# Patient Record
Sex: FEMALE | Race: WHITE | NOT HISPANIC OR LATINO | Employment: PART TIME | URBAN - METROPOLITAN AREA
[De-identification: names, ages, dates, MRNs, and addresses within clinical notes are randomized per-mention and may not be internally consistent; named-entity substitution may affect disease eponyms.]

---

## 2019-02-11 ENCOUNTER — HISTORICAL (OUTPATIENT)
Dept: ADMINISTRATIVE | Facility: HOSPITAL | Age: 44
End: 2019-02-11

## 2019-02-11 LAB
ABS NEUT (OLG): 3.2 X10(3)/MCL (ref 2.1–9.2)
ALBUMIN SERPL-MCNC: 4.6 GM/DL (ref 3.4–5)
ALBUMIN/GLOB SERPL: 1.84 {RATIO} (ref 1.5–2.5)
ALP SERPL-CCNC: 32 UNIT/L (ref 38–126)
ALT SERPL-CCNC: 12 UNIT/L (ref 7–52)
APPEARANCE, UA: NORMAL
AST SERPL-CCNC: 17 UNIT/L (ref 15–37)
BACTERIA #/AREA URNS AUTO: NORMAL /HPF
BILIRUB SERPL-MCNC: 0.6 MG/DL (ref 0.2–1)
BILIRUB UR QL STRIP: NEGATIVE MG/DL
BILIRUBIN DIRECT+TOT PNL SERPL-MCNC: 0.2 MG/DL (ref 0–0.5)
BILIRUBIN DIRECT+TOT PNL SERPL-MCNC: 0.4 MG/DL
BUN SERPL-MCNC: 11 MG/DL (ref 7–18)
CALCIUM SERPL-MCNC: 9.2 MG/DL (ref 8.5–10)
CHLORIDE SERPL-SCNC: 103 MMOL/L (ref 98–107)
CHOLEST SERPL-MCNC: 212 MG/DL (ref 0–200)
CHOLEST/HDLC SERPL: 2.2 {RATIO}
CO2 SERPL-SCNC: 23 MMOL/L (ref 21–32)
COLOR UR: YELLOW
CREAT SERPL-MCNC: 0.62 MG/DL (ref 0.6–1.3)
ERYTHROCYTE [DISTWIDTH] IN BLOOD BY AUTOMATED COUNT: 12.1 % (ref 11.5–17)
GLOBULIN SER-MCNC: 2.5 GM/DL (ref 1.2–3)
GLUCOSE (UA): NEGATIVE MG/DL
GLUCOSE SERPL-MCNC: 102 MG/DL (ref 74–106)
HCT VFR BLD AUTO: 40.8 % (ref 37–47)
HDLC SERPL-MCNC: 98 MG/DL (ref 35–60)
HGB BLD-MCNC: 12.9 GM/DL (ref 12–16)
HGB UR QL STRIP: NEGATIVE UNIT/L
KETONES UR QL STRIP: NEGATIVE MG/DL
LDLC SERPL CALC-MCNC: 75 MG/DL (ref 0–129)
LEUKOCYTE ESTERASE UR QL STRIP: NEGATIVE UNIT/L
LYMPHOCYTES # BLD AUTO: 2.4 X10(3)/MCL (ref 0.6–3.4)
LYMPHOCYTES NFR BLD AUTO: 37.7 % (ref 13–40)
MCH RBC QN AUTO: 32.1 PG (ref 27–31.2)
MCHC RBC AUTO-ENTMCNC: 32 GM/DL (ref 32–36)
MCV RBC AUTO: 102 FL (ref 80–94)
MONOCYTES # BLD AUTO: 0.8 X10(3)/MCL (ref 0.1–1.3)
MONOCYTES NFR BLD AUTO: 12 % (ref 0.1–24)
NEUTROPHILS NFR BLD AUTO: 50.3 % (ref 47–80)
NITRITE UR QL STRIP.AUTO: NEGATIVE
PH UR STRIP: 7 [PH]
PLATELET # BLD AUTO: 277 X10(3)/MCL (ref 130–400)
PMV BLD AUTO: 9.6 FL (ref 9.4–12.4)
POTASSIUM SERPL-SCNC: 4.3 MMOL/L (ref 3.5–5.1)
PROT SERPL-MCNC: 7.1 GM/DL (ref 6.4–8.2)
PROT UR QL STRIP: NEGATIVE MG/DL
RBC # BLD AUTO: 4.02 X10(6)/MCL (ref 4.2–5.4)
RBC #/AREA URNS HPF: NORMAL /HPF
SODIUM SERPL-SCNC: 137 MMOL/L (ref 136–145)
SP GR UR STRIP: 1.02
SQUAMOUS EPITHELIAL, UA: NORMAL /LPF
TRIGL SERPL-MCNC: 62 MG/DL (ref 30–150)
TSH SERPL-ACNC: 2.16 MIU/ML (ref 0.35–4.94)
UROBILINOGEN UR STRIP-ACNC: 0.2 MG/DL
VLDLC SERPL CALC-MCNC: 12.4 MG/DL
WBC # SPEC AUTO: 6.4 X10(3)/MCL (ref 4.5–11.5)
WBC #/AREA URNS AUTO: NORMAL /[HPF]

## 2019-03-11 ENCOUNTER — HISTORICAL (OUTPATIENT)
Dept: ADMINISTRATIVE | Facility: HOSPITAL | Age: 44
End: 2019-03-11

## 2019-03-11 LAB
FLUAV AG NPH QL IA: NEGATIVE
FLUBV AG NPH QL IA: NEGATIVE

## 2020-02-12 ENCOUNTER — HISTORICAL (OUTPATIENT)
Dept: ADMINISTRATIVE | Facility: HOSPITAL | Age: 45
End: 2020-02-12

## 2020-02-12 LAB
ABS NEUT (OLG): 3.7 X10(3)/MCL (ref 2.1–9.2)
ALBUMIN SERPL-MCNC: 4.6 GM/DL (ref 3.4–5)
ALBUMIN/GLOB SERPL: 1.53 {RATIO} (ref 1.5–2.5)
ALP SERPL-CCNC: 34 UNIT/L (ref 38–126)
ALT SERPL-CCNC: 15 UNIT/L (ref 7–52)
APPEARANCE, UA: CLEAR
AST SERPL-CCNC: 19 UNIT/L (ref 15–37)
BACTERIA #/AREA URNS AUTO: ABNORMAL /HPF
BILIRUB SERPL-MCNC: 0.5 MG/DL (ref 0.2–1)
BILIRUB UR QL STRIP: NEGATIVE MG/DL
BILIRUBIN DIRECT+TOT PNL SERPL-MCNC: 0.1 MG/DL (ref 0–0.5)
BILIRUBIN DIRECT+TOT PNL SERPL-MCNC: 0.4 MG/DL
BUN SERPL-MCNC: 13 MG/DL (ref 7–18)
CALCIUM SERPL-MCNC: 9.6 MG/DL (ref 8.5–10)
CHLORIDE SERPL-SCNC: 101 MMOL/L (ref 98–107)
CHOLEST SERPL-MCNC: 205 MG/DL (ref 0–200)
CHOLEST/HDLC SERPL: 1.9 {RATIO}
CO2 SERPL-SCNC: 28 MMOL/L (ref 21–32)
COLOR UR: YELLOW
CREAT SERPL-MCNC: 0.66 MG/DL (ref 0.6–1.3)
ERYTHROCYTE [DISTWIDTH] IN BLOOD BY AUTOMATED COUNT: 12.1 % (ref 11.5–17)
GLOBULIN SER-MCNC: 3 GM/DL (ref 1.2–3)
GLUCOSE (UA): NEGATIVE MG/DL
GLUCOSE SERPL-MCNC: 108 MG/DL (ref 74–106)
HCT VFR BLD AUTO: 39.7 % (ref 37–47)
HDLC SERPL-MCNC: 106 MG/DL (ref 35–60)
HGB BLD-MCNC: 12.9 GM/DL (ref 12–16)
HGB UR QL STRIP: ABNORMAL UNIT/L
KETONES UR QL STRIP: NEGATIVE MG/DL
LDLC SERPL CALC-MCNC: 79 MG/DL (ref 0–129)
LEUKOCYTE ESTERASE UR QL STRIP: NEGATIVE UNIT/L
LYMPHOCYTES # BLD AUTO: 1.9 X10(3)/MCL (ref 0.6–3.4)
LYMPHOCYTES NFR BLD AUTO: 30.5 % (ref 13–40)
MCH RBC QN AUTO: 31.5 PG (ref 27–31.2)
MCHC RBC AUTO-ENTMCNC: 32 GM/DL (ref 32–36)
MCV RBC AUTO: 97 FL (ref 80–94)
MONOCYTES # BLD AUTO: 0.7 X10(3)/MCL (ref 0.1–1.3)
MONOCYTES NFR BLD AUTO: 11 % (ref 0.1–24)
NEUTROPHILS NFR BLD AUTO: 58.5 % (ref 47–80)
NITRITE UR QL STRIP.AUTO: NEGATIVE
PH UR STRIP: 7.5 [PH]
PLATELET # BLD AUTO: 254 X10(3)/MCL (ref 130–400)
PMV BLD AUTO: 9.7 FL (ref 9.4–12.4)
POTASSIUM SERPL-SCNC: 4.2 MMOL/L (ref 3.5–5.1)
PROT SERPL-MCNC: 7.6 GM/DL (ref 6.4–8.2)
PROT UR QL STRIP: NEGATIVE MG/DL
RBC # BLD AUTO: 4.09 X10(6)/MCL (ref 4.2–5.4)
RBC #/AREA URNS HPF: ABNORMAL /HPF
SODIUM SERPL-SCNC: 138 MMOL/L (ref 136–145)
SP GR UR STRIP: 1.02
SQUAMOUS EPITHELIAL, UA: ABNORMAL /LPF
TRIGL SERPL-MCNC: 94 MG/DL (ref 30–150)
TSH SERPL-ACNC: 1.76 MIU/ML (ref 0.35–4.94)
UROBILINOGEN UR STRIP-ACNC: 0.2 MG/DL
VLDLC SERPL CALC-MCNC: 18.8 MG/DL
WBC # SPEC AUTO: 6.3 X10(3)/MCL (ref 4.5–11.5)
WBC #/AREA URNS AUTO: ABNORMAL /[HPF]

## 2020-09-14 ENCOUNTER — HISTORICAL (OUTPATIENT)
Dept: RADIOLOGY | Facility: HOSPITAL | Age: 45
End: 2020-09-14

## 2020-09-24 ENCOUNTER — HISTORICAL (OUTPATIENT)
Dept: RADIOLOGY | Facility: HOSPITAL | Age: 45
End: 2020-09-24

## 2021-04-01 ENCOUNTER — HISTORICAL (OUTPATIENT)
Dept: ADMINISTRATIVE | Facility: HOSPITAL | Age: 46
End: 2021-04-01

## 2021-04-01 LAB
ABS NEUT (OLG): 6.3 X10(3)/MCL (ref 2.1–9.2)
ALBUMIN SERPL-MCNC: 4.7 GM/DL (ref 3.4–5)
ALBUMIN/GLOB SERPL: 1.68 {RATIO} (ref 1.5–2.5)
ALP SERPL-CCNC: 41 UNIT/L (ref 38–126)
ALT SERPL-CCNC: 15 UNIT/L (ref 7–52)
APPEARANCE, UA: CLEAR
AST SERPL-CCNC: 19 UNIT/L (ref 15–37)
BACTERIA #/AREA URNS AUTO: NORMAL /HPF
BILIRUB SERPL-MCNC: 0.5 MG/DL (ref 0.2–1)
BILIRUB UR QL STRIP: NEGATIVE MG/DL
BILIRUBIN DIRECT+TOT PNL SERPL-MCNC: 0.1 MG/DL (ref 0–0.5)
BILIRUBIN DIRECT+TOT PNL SERPL-MCNC: 0.4 MG/DL
BUN SERPL-MCNC: 9 MG/DL (ref 7–18)
CALCIUM SERPL-MCNC: 10.1 MG/DL (ref 8.5–10)
CHLORIDE SERPL-SCNC: 102 MMOL/L (ref 98–107)
CHOLEST SERPL-MCNC: 197 MG/DL (ref 0–200)
CHOLEST/HDLC SERPL: 2.2 {RATIO}
CO2 SERPL-SCNC: 26 MMOL/L (ref 21–32)
COLOR UR: YELLOW
CREAT SERPL-MCNC: 0.67 MG/DL (ref 0.6–1.3)
ERYTHROCYTE [DISTWIDTH] IN BLOOD BY AUTOMATED COUNT: 11.7 % (ref 11.5–17)
GLOBULIN SER-MCNC: 2.8 GM/DL (ref 1.2–3)
GLUCOSE (UA): NEGATIVE MG/DL
GLUCOSE SERPL-MCNC: 100 MG/DL (ref 74–106)
HCT VFR BLD AUTO: 39.3 % (ref 37–47)
HDLC SERPL-MCNC: 90 MG/DL (ref 35–60)
HGB BLD-MCNC: 12.8 GM/DL (ref 12–16)
HGB UR QL STRIP: NEGATIVE UNIT/L
KETONES UR QL STRIP: NEGATIVE MG/DL
LDLC SERPL CALC-MCNC: 82 MG/DL (ref 0–129)
LEUKOCYTE ESTERASE UR QL STRIP: NEGATIVE UNIT/L
LYMPHOCYTES # BLD AUTO: 2.1 X10(3)/MCL (ref 0.6–3.4)
LYMPHOCYTES NFR BLD AUTO: 23 % (ref 13–40)
MCH RBC QN AUTO: 31.4 PG (ref 27–31.2)
MCHC RBC AUTO-ENTMCNC: 33 GM/DL (ref 32–36)
MCV RBC AUTO: 97 FL (ref 80–94)
MONOCYTES # BLD AUTO: 0.8 X10(3)/MCL (ref 0.1–1.3)
MONOCYTES NFR BLD AUTO: 8.8 % (ref 0.1–24)
NEUTROPHILS NFR BLD AUTO: 68.2 % (ref 47–80)
NITRITE UR QL STRIP.AUTO: NEGATIVE
PH UR STRIP: 7.5 [PH]
PLATELET # BLD AUTO: 284 X10(3)/MCL (ref 130–400)
PMV BLD AUTO: 10.2 FL (ref 9.4–12.4)
POTASSIUM SERPL-SCNC: 4 MMOL/L (ref 3.5–5.1)
PROT SERPL-MCNC: 7.5 GM/DL (ref 6.4–8.2)
PROT UR QL STRIP: NEGATIVE MG/DL
RBC # BLD AUTO: 4.07 X10(6)/MCL (ref 4.2–5.4)
RBC #/AREA URNS HPF: NORMAL /HPF
SODIUM SERPL-SCNC: 137 MMOL/L (ref 136–145)
SP GR UR STRIP: 1.02
SQUAMOUS EPITHELIAL, UA: NORMAL /LPF
TRIGL SERPL-MCNC: 75 MG/DL (ref 30–150)
TSH SERPL-ACNC: 1.2 MIU/ML (ref 0.35–4.94)
UROBILINOGEN UR STRIP-ACNC: 0.2 MG/DL
VLDLC SERPL CALC-MCNC: 15 MG/DL
WBC # SPEC AUTO: 9.2 X10(3)/MCL (ref 4.5–11.5)
WBC #/AREA URNS AUTO: NORMAL /[HPF]

## 2021-09-01 LAB
PAP RECOMMENDATION EXT: NORMAL
PAP SMEAR: NORMAL

## 2022-03-07 ENCOUNTER — HISTORICAL (OUTPATIENT)
Dept: ADMINISTRATIVE | Facility: HOSPITAL | Age: 47
End: 2022-03-07

## 2022-03-07 ENCOUNTER — HISTORICAL (OUTPATIENT)
Dept: RADIOLOGY | Facility: HOSPITAL | Age: 47
End: 2022-03-07

## 2022-03-14 ENCOUNTER — HISTORICAL (OUTPATIENT)
Dept: ADMINISTRATIVE | Facility: HOSPITAL | Age: 47
End: 2022-03-14

## 2022-03-14 ENCOUNTER — HISTORICAL (OUTPATIENT)
Dept: RADIOLOGY | Facility: HOSPITAL | Age: 47
End: 2022-03-14

## 2022-03-31 ENCOUNTER — HISTORICAL (OUTPATIENT)
Dept: RADIOLOGY | Facility: HOSPITAL | Age: 47
End: 2022-03-31

## 2022-03-31 ENCOUNTER — HISTORICAL (OUTPATIENT)
Dept: ADMINISTRATIVE | Facility: HOSPITAL | Age: 47
End: 2022-03-31

## 2022-04-11 ENCOUNTER — HISTORICAL (OUTPATIENT)
Dept: ADMINISTRATIVE | Facility: HOSPITAL | Age: 47
End: 2022-04-11

## 2022-04-25 ENCOUNTER — HISTORICAL (OUTPATIENT)
Dept: HEMATOLOGY/ONCOLOGY | Facility: CLINIC | Age: 47
End: 2022-04-25
Payer: COMMERCIAL

## 2022-04-28 VITALS
HEIGHT: 61 IN | BODY MASS INDEX: 29.63 KG/M2 | DIASTOLIC BLOOD PRESSURE: 90 MMHG | WEIGHT: 156.94 LBS | SYSTOLIC BLOOD PRESSURE: 142 MMHG

## 2022-05-05 ENCOUNTER — LAB VISIT (OUTPATIENT)
Dept: LAB | Facility: HOSPITAL | Age: 47
End: 2022-05-05
Attending: SURGERY
Payer: COMMERCIAL

## 2022-05-05 DIAGNOSIS — D05.10 DUCTAL CARCINOMA IN SITU OF BREAST: Primary | ICD-10-CM

## 2022-05-05 LAB
ALBUMIN SERPL-MCNC: 4.5 GM/DL (ref 3.5–5)
ALBUMIN/GLOB SERPL: 1.3 RATIO (ref 1.1–2)
ALP SERPL-CCNC: 49 UNIT/L (ref 40–150)
ALT SERPL-CCNC: 18 UNIT/L (ref 0–55)
AST SERPL-CCNC: 22 UNIT/L (ref 5–34)
BILIRUBIN DIRECT+TOT PNL SERPL-MCNC: 0.2 MG/DL (ref 0–0.5)
BILIRUBIN DIRECT+TOT PNL SERPL-MCNC: 0.2 MG/DL (ref 0–0.8)
BILIRUBIN DIRECT+TOT PNL SERPL-MCNC: 0.4 MG/DL
BUN SERPL-MCNC: 11.9 MG/DL (ref 7–18.7)
CALCIUM SERPL-MCNC: 10.1 MG/DL (ref 8.4–10.2)
CHLORIDE SERPL-SCNC: 104 MMOL/L (ref 98–107)
CO2 SERPL-SCNC: 27 MMOL/L (ref 22–29)
CREAT SERPL-MCNC: 0.73 MG/DL (ref 0.55–1.02)
ERYTHROCYTE [DISTWIDTH] IN BLOOD BY AUTOMATED COUNT: 12.4 % (ref 11.5–17)
GLOBULIN SER-MCNC: 3.5 GM/DL (ref 2.4–3.5)
GLUCOSE SERPL-MCNC: 83 MG/DL (ref 74–100)
HCT VFR BLD AUTO: 42.8 % (ref 37–47)
HGB BLD-MCNC: 13.7 GM/DL (ref 12–16)
MCH RBC QN AUTO: 30.4 PG (ref 27–31)
MCHC RBC AUTO-ENTMCNC: 32 MG/DL (ref 33–36)
MCV RBC AUTO: 95.1 FL (ref 80–94)
NRBC BLD AUTO-RTO: 0 %
PLATELET # BLD AUTO: 344 X10(3)/MCL (ref 130–400)
PMV BLD AUTO: 11.1 FL (ref 9.4–12.4)
POTASSIUM SERPL-SCNC: 4.4 MMOL/L (ref 3.5–5.1)
PROT SERPL-MCNC: 8 GM/DL (ref 6.4–8.3)
RBC # BLD AUTO: 4.5 X10(6)/MCL (ref 4.2–5.4)
SODIUM SERPL-SCNC: 140 MMOL/L (ref 136–145)
WBC # SPEC AUTO: 10.1 X10(3)/MCL (ref 4.5–11.5)

## 2022-05-05 PROCEDURE — 36415 COLL VENOUS BLD VENIPUNCTURE: CPT

## 2022-05-05 PROCEDURE — 85027 COMPLETE CBC AUTOMATED: CPT

## 2022-05-05 PROCEDURE — 80053 COMPREHEN METABOLIC PANEL: CPT

## 2022-05-09 RX ORDER — ACETAMINOPHEN AND CODEINE PHOSPHATE 120; 12 MG/5ML; MG/5ML
1 SOLUTION ORAL NIGHTLY
COMMUNITY
End: 2022-08-03

## 2022-05-09 RX ORDER — LISINOPRIL 20 MG/1
20 TABLET ORAL NIGHTLY
COMMUNITY
End: 2022-06-20

## 2022-05-10 ENCOUNTER — ANESTHESIA EVENT (OUTPATIENT)
Dept: SURGERY | Facility: HOSPITAL | Age: 47
End: 2022-05-10
Payer: COMMERCIAL

## 2022-05-11 ENCOUNTER — ANESTHESIA (OUTPATIENT)
Dept: SURGERY | Facility: HOSPITAL | Age: 47
End: 2022-05-11
Payer: COMMERCIAL

## 2022-05-11 ENCOUNTER — HOSPITAL ENCOUNTER (OUTPATIENT)
Facility: HOSPITAL | Age: 47
Discharge: HOME | End: 2022-05-11
Attending: INTERNAL MEDICINE | Admitting: INTERNAL MEDICINE
Payer: COMMERCIAL

## 2022-05-11 DIAGNOSIS — N63.0 BREAST LUMP: ICD-10-CM

## 2022-05-11 LAB
B-HCG UR QL: NEGATIVE
CTP QC/QA: YES

## 2022-05-11 PROCEDURE — 63600175 PHARM REV CODE 636 W HCPCS: Performed by: SURGERY

## 2022-05-11 PROCEDURE — 63600175 PHARM REV CODE 636 W HCPCS: Performed by: NURSE ANESTHETIST, CERTIFIED REGISTERED

## 2022-05-11 PROCEDURE — 71000015 HC POSTOP RECOV 1ST HR: Performed by: SURGERY

## 2022-05-11 PROCEDURE — 37000008 HC ANESTHESIA 1ST 15 MINUTES: Performed by: SURGERY

## 2022-05-11 PROCEDURE — 36000706: Performed by: SURGERY

## 2022-05-11 PROCEDURE — 63600175 PHARM REV CODE 636 W HCPCS

## 2022-05-11 PROCEDURE — 37000009 HC ANESTHESIA EA ADD 15 MINS: Performed by: SURGERY

## 2022-05-11 PROCEDURE — 71000016 HC POSTOP RECOV ADDL HR: Performed by: SURGERY

## 2022-05-11 PROCEDURE — 81025 URINE PREGNANCY TEST: CPT | Performed by: INTERNAL MEDICINE

## 2022-05-11 PROCEDURE — 63600175 PHARM REV CODE 636 W HCPCS: Performed by: ANESTHESIOLOGY

## 2022-05-11 PROCEDURE — C1713 ANCHOR/SCREW BN/BN,TIS/BN: HCPCS | Performed by: SURGERY

## 2022-05-11 PROCEDURE — 25000003 PHARM REV CODE 250

## 2022-05-11 PROCEDURE — 36000707: Performed by: SURGERY

## 2022-05-11 PROCEDURE — 25000003 PHARM REV CODE 250: Performed by: SURGERY

## 2022-05-11 PROCEDURE — 19301 PR MASTECTOMY, PARTIAL: ICD-10-PCS | Mod: RT,,, | Performed by: SURGERY

## 2022-05-11 PROCEDURE — 27201423 OPTIME MED/SURG SUP & DEVICES STERILE SUPPLY: Performed by: SURGERY

## 2022-05-11 PROCEDURE — 19301 PARTIAL MASTECTOMY: CPT | Mod: RT,,, | Performed by: SURGERY

## 2022-05-11 PROCEDURE — 71000033 HC RECOVERY, INTIAL HOUR: Performed by: SURGERY

## 2022-05-11 RX ORDER — CEFAZOLIN SODIUM 2 G/50ML
2 SOLUTION INTRAVENOUS
Status: DISCONTINUED | OUTPATIENT
Start: 2022-05-11 | End: 2022-05-11 | Stop reason: HOSPADM

## 2022-05-11 RX ORDER — MEPERIDINE HYDROCHLORIDE 25 MG/ML
12.5 INJECTION INTRAMUSCULAR; INTRAVENOUS; SUBCUTANEOUS EVERY 10 MIN PRN
Status: DISCONTINUED | OUTPATIENT
Start: 2022-05-11 | End: 2022-05-11 | Stop reason: HOSPADM

## 2022-05-11 RX ORDER — BUPIVACAINE HYDROCHLORIDE 2.5 MG/ML
INJECTION, SOLUTION EPIDURAL; INFILTRATION; INTRACAUDAL
Status: DISCONTINUED | OUTPATIENT
Start: 2022-05-11 | End: 2022-05-11 | Stop reason: HOSPADM

## 2022-05-11 RX ORDER — BUPIVACAINE HYDROCHLORIDE 2.5 MG/ML
INJECTION, SOLUTION EPIDURAL; INFILTRATION; INTRACAUDAL
Status: DISCONTINUED
Start: 2022-05-11 | End: 2022-05-11 | Stop reason: HOSPADM

## 2022-05-11 RX ORDER — FENTANYL CITRATE 50 UG/ML
INJECTION, SOLUTION INTRAMUSCULAR; INTRAVENOUS
Status: DISCONTINUED | OUTPATIENT
Start: 2022-05-11 | End: 2022-05-13

## 2022-05-11 RX ORDER — HYDROMORPHONE HYDROCHLORIDE 2 MG/ML
0.4 INJECTION, SOLUTION INTRAMUSCULAR; INTRAVENOUS; SUBCUTANEOUS EVERY 5 MIN PRN
Status: DISCONTINUED | OUTPATIENT
Start: 2022-05-11 | End: 2022-05-11 | Stop reason: HOSPADM

## 2022-05-11 RX ORDER — SODIUM CHLORIDE, SODIUM GLUCONATE, SODIUM ACETATE, POTASSIUM CHLORIDE AND MAGNESIUM CHLORIDE 30; 37; 368; 526; 502 MG/100ML; MG/100ML; MG/100ML; MG/100ML; MG/100ML
1000 INJECTION, SOLUTION INTRAVENOUS CONTINUOUS
Status: DISCONTINUED | OUTPATIENT
Start: 2022-05-11 | End: 2022-05-11 | Stop reason: HOSPADM

## 2022-05-11 RX ORDER — LIDOCAINE HYDROCHLORIDE 10 MG/ML
1 INJECTION, SOLUTION EPIDURAL; INFILTRATION; INTRACAUDAL; PERINEURAL ONCE
Status: DISCONTINUED | OUTPATIENT
Start: 2022-05-11 | End: 2022-05-11 | Stop reason: HOSPADM

## 2022-05-11 RX ORDER — ENOXAPARIN SODIUM 100 MG/ML
30 INJECTION SUBCUTANEOUS
Status: DISCONTINUED | OUTPATIENT
Start: 2022-05-11 | End: 2022-05-11 | Stop reason: HOSPADM

## 2022-05-11 RX ORDER — DEXAMETHASONE SODIUM PHOSPHATE 4 MG/ML
INJECTION, SOLUTION INTRA-ARTICULAR; INTRALESIONAL; INTRAMUSCULAR; INTRAVENOUS; SOFT TISSUE
Status: DISCONTINUED | OUTPATIENT
Start: 2022-05-11 | End: 2022-05-13

## 2022-05-11 RX ORDER — CEFAZOLIN SODIUM 1 G/3ML
INJECTION, POWDER, FOR SOLUTION INTRAMUSCULAR; INTRAVENOUS
Status: DISCONTINUED
Start: 2022-05-11 | End: 2022-05-11 | Stop reason: HOSPADM

## 2022-05-11 RX ORDER — LORAZEPAM 2 MG/ML
0.25 INJECTION INTRAMUSCULAR ONCE AS NEEDED
Status: DISCONTINUED | OUTPATIENT
Start: 2022-05-11 | End: 2022-05-11 | Stop reason: HOSPADM

## 2022-05-11 RX ORDER — HYDROCODONE BITARTRATE AND ACETAMINOPHEN 5; 325 MG/1; MG/1
1 TABLET ORAL EVERY 6 HOURS PRN
Qty: 20 TABLET | Refills: 0 | Status: SHIPPED | OUTPATIENT
Start: 2022-05-11 | End: 2022-08-03

## 2022-05-11 RX ORDER — SCOLOPAMINE TRANSDERMAL SYSTEM 1 MG/1
1 PATCH, EXTENDED RELEASE TRANSDERMAL
Status: DISCONTINUED | OUTPATIENT
Start: 2022-05-11 | End: 2022-05-11 | Stop reason: HOSPADM

## 2022-05-11 RX ORDER — MIDAZOLAM HYDROCHLORIDE 1 MG/ML
2 INJECTION INTRAMUSCULAR; INTRAVENOUS ONCE AS NEEDED
Status: COMPLETED | OUTPATIENT
Start: 2022-05-11 | End: 2022-05-11

## 2022-05-11 RX ORDER — MIDAZOLAM HYDROCHLORIDE 1 MG/ML
INJECTION INTRAMUSCULAR; INTRAVENOUS
Status: COMPLETED
Start: 2022-05-11 | End: 2022-05-11

## 2022-05-11 RX ORDER — ONDANSETRON 4 MG/1
8 TABLET, ORALLY DISINTEGRATING ORAL EVERY 6 HOURS PRN
Status: DISCONTINUED | OUTPATIENT
Start: 2022-05-11 | End: 2022-05-11 | Stop reason: HOSPADM

## 2022-05-11 RX ORDER — SODIUM CHLORIDE, SODIUM LACTATE, POTASSIUM CHLORIDE, CALCIUM CHLORIDE 600; 310; 30; 20 MG/100ML; MG/100ML; MG/100ML; MG/100ML
INJECTION, SOLUTION INTRAVENOUS CONTINUOUS
Status: DISCONTINUED | OUTPATIENT
Start: 2022-05-11 | End: 2022-05-11 | Stop reason: HOSPADM

## 2022-05-11 RX ORDER — ACETAMINOPHEN 10 MG/ML
1000 INJECTION, SOLUTION INTRAVENOUS ONCE
Status: COMPLETED | OUTPATIENT
Start: 2022-05-11 | End: 2022-05-11

## 2022-05-11 RX ORDER — IPRATROPIUM BROMIDE AND ALBUTEROL SULFATE 2.5; .5 MG/3ML; MG/3ML
3 SOLUTION RESPIRATORY (INHALATION)
Status: DISCONTINUED | OUTPATIENT
Start: 2022-05-11 | End: 2022-05-11 | Stop reason: HOSPADM

## 2022-05-11 RX ORDER — SCOLOPAMINE TRANSDERMAL SYSTEM 1 MG/1
PATCH, EXTENDED RELEASE TRANSDERMAL
Status: COMPLETED
Start: 2022-05-11 | End: 2022-05-11

## 2022-05-11 RX ORDER — ONDANSETRON 2 MG/ML
4 INJECTION INTRAMUSCULAR; INTRAVENOUS DAILY PRN
Status: DISCONTINUED | OUTPATIENT
Start: 2022-05-11 | End: 2022-05-11 | Stop reason: HOSPADM

## 2022-05-11 RX ORDER — PROCHLORPERAZINE EDISYLATE 5 MG/ML
5 INJECTION INTRAMUSCULAR; INTRAVENOUS EVERY 30 MIN PRN
Status: DISCONTINUED | OUTPATIENT
Start: 2022-05-11 | End: 2022-05-11 | Stop reason: HOSPADM

## 2022-05-11 RX ADMIN — SODIUM CHLORIDE, POTASSIUM CHLORIDE, SODIUM LACTATE AND CALCIUM CHLORIDE 1000 ML: 600; 310; 30; 20 INJECTION, SOLUTION INTRAVENOUS at 09:05

## 2022-05-11 RX ADMIN — SODIUM CHLORIDE, POTASSIUM CHLORIDE, SODIUM LACTATE AND CALCIUM CHLORIDE: 600; 310; 30; 20 INJECTION, SOLUTION INTRAVENOUS at 08:05

## 2022-05-11 RX ADMIN — SCOLOPAMINE TRANSDERMAL SYSTEM 1 PATCH: 1 PATCH, EXTENDED RELEASE TRANSDERMAL at 07:05

## 2022-05-11 RX ADMIN — MIDAZOLAM HYDROCHLORIDE 2 MG: 1 INJECTION INTRAMUSCULAR; INTRAVENOUS at 07:05

## 2022-05-11 RX ADMIN — ACETAMINOPHEN 1000 MG: 10 INJECTION, SOLUTION INTRAVENOUS at 07:05

## 2022-05-11 RX ADMIN — ENOXAPARIN SODIUM 30 MG: 30 INJECTION SUBCUTANEOUS at 07:05

## 2022-05-11 RX ADMIN — SODIUM CHLORIDE, POTASSIUM CHLORIDE, SODIUM LACTATE AND CALCIUM CHLORIDE 1000 ML: 600; 310; 30; 20 INJECTION, SOLUTION INTRAVENOUS at 10:05

## 2022-05-11 RX ADMIN — SODIUM CHLORIDE, POTASSIUM CHLORIDE, SODIUM LACTATE AND CALCIUM CHLORIDE: 600; 310; 30; 20 INJECTION, SOLUTION INTRAVENOUS at 07:05

## 2022-05-11 RX ADMIN — FENTANYL CITRATE 50 MCG: 50 INJECTION, SOLUTION INTRAMUSCULAR; INTRAVENOUS at 09:05

## 2022-05-11 RX ADMIN — MIDAZOLAM HYDROCHLORIDE 2 MG: 1 INJECTION, SOLUTION INTRAMUSCULAR; INTRAVENOUS at 07:05

## 2022-05-11 RX ADMIN — SCOPOLAMINE 1 PATCH: 1 PATCH TRANSDERMAL at 07:05

## 2022-05-11 RX ADMIN — DEXAMETHASONE SODIUM PHOSPHATE 8 MG: 4 INJECTION, SOLUTION INTRA-ARTICULAR; INTRALESIONAL; INTRAMUSCULAR; INTRAVENOUS; SOFT TISSUE at 09:05

## 2022-05-11 NOTE — ANESTHESIA PROCEDURE NOTES
Intubation    Date/Time: 5/11/2022 8:57 AM  Performed by: Stacy Martinez CRNA  Authorized by: Stacy Martinez CRNA     Intubation:     Induction:  Inhalational - mask    Intubated:  Postinduction    Attempts:  1    Attempted By:  CRNA    Difficult Airway Encountered?: No      Airway Device:  Supraglottic airway/LMA    Airway Device Size:  3.0    Style/Cuff Inflation:  Cuffed (inflated to minimal occlusive pressure)    Secured at:  The lips    Placement Verified By:  Capnometry    Complicating Factors:  None    Findings Post-Intubation:  BS equal bilateral

## 2022-05-11 NOTE — TRANSFER OF CARE
"Anesthesia Transfer of Care Note    Patient: Glenis Roberson    Procedure(s) Performed: Procedure(s) (LRB):  BIOPSY, BREAST, WITH LUMPECTOMY (Right)  INSERTION, LOCALIZATION WIRE, ULTRASOUND GUIDED (Right)    Patient location: PACU    Anesthesia Type: general    Transport from OR: Transported from OR on room air with adequate spontaneous ventilation    Post pain: adequate analgesia    Post assessment: no apparent anesthetic complications    Post vital signs: stable    Level of consciousness: responds to stimulation    Nausea/Vomiting: no nausea/vomiting    Complications: none    Transfer of care protocol was followed      Last vitals:   Visit Vitals  BP (!) 142/87 (BP Location: Right arm, Patient Position: Lying)   Pulse 99   Temp 36 °C (96.8 °F) (Tympanic)   Resp 16   Ht 5' 2" (1.575 m)   Wt 69.5 kg (153 lb 3.5 oz)   LMP 04/29/2022 (Exact Date)   SpO2 99%   Breastfeeding No   BMI 28.02 kg/m²     "

## 2022-05-11 NOTE — ANESTHESIA PREPROCEDURE EVALUATION
05/11/2022  Glenis Roberson is a 47 y.o., female with right breast ca for right lumpectomy.    Last 3 sets of Vitals    Vitals - 1 value per visit 11/18/2021 5/9/2022 5/11/2022   SYSTOLIC 142 - 162   DIASTOLIC 90 - 98   Pulse - - 79   Temp - - 98.7   Resp - - 20   SPO2 - - 99   Weight (lb) - 150 -   Weight (kg) - 68.04 -   Height - 62 -   BMI (Calculated) - 27.4 -         Lab Results   Component Value Date    WBC 10.1 05/05/2022    HGB 13.7 05/05/2022    HCT 42.8 05/05/2022    MCV 95.1 (H) 05/05/2022     05/05/2022       CMP  Sodium Level   Date Value Ref Range Status   05/05/2022 140 136 - 145 mmol/L Final     Potassium Level   Date Value Ref Range Status   05/05/2022 4.4 3.5 - 5.1 mmol/L Final     Carbon Dioxide   Date Value Ref Range Status   05/05/2022 27 22 - 29 mmol/L Final     Blood Urea Nitrogen   Date Value Ref Range Status   05/05/2022 11.9 7.0 - 18.7 mg/dL Final     Creatinine   Date Value Ref Range Status   05/05/2022 0.73 0.55 - 1.02 mg/dL Final     Calcium Level Total   Date Value Ref Range Status   05/05/2022 10.1 8.4 - 10.2 mg/dL Final     Albumin Level   Date Value Ref Range Status   05/05/2022 4.5 3.5 - 5.0 gm/dL Final     Bilirubin Total   Date Value Ref Range Status   05/05/2022 0.4 <=1.5 mg/dL Final     Alkaline Phosphatase   Date Value Ref Range Status   05/05/2022 49 40 - 150 unit/L Final     Aspartate Aminotransferase   Date Value Ref Range Status   05/05/2022 22 5 - 34 unit/L Final     Alanine Aminotransferase   Date Value Ref Range Status   05/05/2022 18 0 - 55 unit/L Final     Estimated GFR-Non    Date Value Ref Range Status   05/05/2022 >60 mls/min/1.73/m2 Final                Pre-op Assessment    I have reviewed the Patient Summary Reports.     I have reviewed the Nursing Notes. I have reviewed the NPO Status.   I have reviewed the Medications.     Review of  Systems  Anesthesia Hx:  Hx of Anesthetic complications  History of prior surgery of interest to airway management or planning: Personal Hx of Anesthesia complications, Post-Operative Nausea/Vomiting, in the past, but not with recent anesthetics / prophylaxis   Cardiovascular:   Hypertension  Functional Capacity good / => 4 METS        Physical Exam  General: Well nourished, Cooperative, Alert and Oriented    Airway:  Mallampati: II   Mouth Opening: Normal  TM Distance: Normal  Tongue: Normal  Neck ROM: Normal ROM    Dental:  Intact    Chest/Lungs:  Clear to auscultation, Normal Respiratory Rate    Heart:  Rate: Normal  Rhythm: Regular Rhythm        Anesthesia Plan  Type of Anesthesia, risks & benefits discussed:    Anesthesia Type: Gen Supraglottic Airway  Intra-op Monitoring Plan: Standard ASA Monitors  Post Op Pain Control Plan: multimodal analgesia and IV/PO Opioids PRN  Induction:  IV  Airway Plan: Direct  Informed Consent: Informed consent signed with the Patient and all parties understand the risks and agree with anesthesia plan.  All questions answered.   ASA Score: 2  Day of Surgery Review of History & Physical: H&P Update referred to the surgeon/provider.    Ready For Surgery From Anesthesia Perspective.     .

## 2022-05-11 NOTE — DISCHARGE SUMMARY
[facility] Surgical - Periop Services  Discharge Note  Short Stay    Procedure(s) (LRB):  BIOPSY, BREAST, WITH LUMPECTOMY (Right)  INSERTION, LOCALIZATION WIRE, ULTRASOUND GUIDED (Right)    OUTCOME: Patient tolerated treatment/procedure well without complication and is now ready for discharge.    DISPOSITION: Home or Self Care    FINAL DIAGNOSIS:  <principal problem not specified>    FOLLOWUP: In clinic    DISCHARGE INSTRUCTIONS:  No discharge procedures on file.     TIME SPENT ON DISCHARGE:  minutes

## 2022-05-11 NOTE — OP NOTE
Surgeon:  Scot Zarate MD    Assistant:  AGUEDA Ceron                                         Pre-op Diagnosis:  Right breast DCIS    Post-op Diagnosis:  Same    Procedure:  1. Right breast Ultrasound-guided wire localization and lumpectomy  2. Re-excision of the medial margin  3. Re-excision of the superior margin  4. Re-excision of the deep margin       Anesthesia:            GETA     EBL:    Less than 25 cc     Specimens:     right breast lumpectomy with margin maps, re-excision of superior, medial and deep margin is submitted as separate specimens with new margins marked for orientation     Findings:         Ultrasound was used intraoperatively to identify the marking clip, place a localization wire and guide wide local excision.   Specimen radiograph/ultrasound confirmed excision of the marking clip and the area of concern, based on margin mapping with specimen radiography, the superior medial and deep margins were reexcised to 1 cm gross margins.  The deep margin included the pectoralis fascia EN bloc.     Procedure in detail: After informed consent was obtained the patient was brought to the operating room placed in supine position.  General endotracheal anesthesia was administered without difficulty.  The patient's breast was prepped and draped in sterile fashion.    Attention was turned towards the breast.  Focused ultrasound was performed and the mass with associated marking clip were identified.  Under ultrasound guidance I performed wire localization.  Incision was made over the area the area around the wire was excised circumferentially.  The specimen was marked with suture long lateral and short superior.   margin map was also used Specimen radiograph revealed the marking clip within the specimen.  based on the margin map and approximation of calcifications within the specimen.  The excision was performed of the superior medial and deep margins 1 cm gross margins.  The deep margin included the  pectoralis fascia EN bloc.  The wounds were irrigated with antimicrobial solution after meticulous hemostasis was achieved and closed in multiple layers with absorbable suture sterile dressings were applied.  The patient tolerated the procedure well.

## 2022-05-11 NOTE — ANESTHESIA POSTPROCEDURE EVALUATION
Anesthesia Post Evaluation    Patient: Glenis Roberson    Procedure(s) Performed: Procedure(s) (LRB):  BIOPSY, BREAST, WITH LUMPECTOMY (Right)  INSERTION, LOCALIZATION WIRE, ULTRASOUND GUIDED (Right)    Final Anesthesia Type: general      Patient location during evaluation: floor  Patient participation: Yes- Able to Participate  Level of consciousness: awake and alert  Post-procedure vital signs: reviewed and stable  Pain management: adequate  Airway patency: patent    PONV status at discharge: No PONV  Anesthetic complications: no      Cardiovascular status: blood pressure returned to baseline  Respiratory status: spontaneous ventilation and room air  Hydration status: euvolemic  Follow-up not needed.          Vitals Value Taken Time   /83 05/11/22 1054   Temp 37 °C (98.6 °F) 05/11/22 1010   Pulse 69 05/11/22 1054   Resp 14 05/11/22 1034   SpO2 98 % 05/11/22 1054   Vitals shown include unvalidated device data.      Event Time   Out of Recovery 10:33:21         Pain/Iveth Score: Pain Rating Prior to Med Admin: 0 (5/11/2022  7:47 AM)  Iveth Score: 10 (5/11/2022 10:35 AM)

## 2022-05-16 LAB
DHEA SERPL-MCNC: NORMAL
ESTROGEN SERPL-MCNC: NORMAL PG/ML
INSULIN SERPL-ACNC: NORMAL U[IU]/ML
LAB AP CLINICAL INFORMATION: NORMAL
LAB AP GROSS DESCRIPTION: NORMAL
LAB AP REPORT FOOTNOTES: NORMAL
T3RU NFR SERPL: NORMAL %

## 2022-05-17 VITALS
OXYGEN SATURATION: 98 % | HEART RATE: 71 BPM | WEIGHT: 153.25 LBS | SYSTOLIC BLOOD PRESSURE: 126 MMHG | HEIGHT: 62 IN | DIASTOLIC BLOOD PRESSURE: 80 MMHG | TEMPERATURE: 99 F | RESPIRATION RATE: 18 BRPM | BODY MASS INDEX: 28.2 KG/M2

## 2022-05-23 NOTE — PROGRESS NOTES
REFERRING PHYSICIAN:    Subjective:       Patient ID: Glenis Roberson is a 47 y.o. female.    Chief Complaint: No personal history of cancer but a family history of cancer. Patient presented for genetic counseling on 12/2/2020 and was found appropriate for genetic testing based on the National Comprehensive Cancer Network (NCCN) criteria due to a family history that includes breast cancer in at least three (3) close family members on the same side of the family (mother, maternal grandmother, maternal great aunt) (see family history and pedigree). She declined testing at that time. She recently was diagnosed with DCIS of right UOQ and contacted the clinic to proceed with testing. She signed consent, lab was drawn and sent to [facility] for BRCA1/2 Analysis with [employer] panel testing. She presented via Telemedicine Visit (audio and video) today for results disclosure.     HPI  Past Medical History:   Diagnosis Date    Cancer     Breast    Heart murmur     History of multiple miscarriages     Hypertension     Mitral valve prolapse     Mosaic Briceno syndrome     pt has seen Dr. Coleman.. last visit about 7 yrs ago.    Nasal fracture     PONV (postoperative nausea and vomiting)       Review of patient's allergies indicates:   Allergen Reactions    Sulfa (sulfonamide antibiotics) Hives      Review of Systems   Constitutional: Negative for appetite change and unexpected weight change.   HENT: Negative for mouth sores.    Eyes: Negative for visual disturbance.   Respiratory: Negative for cough and shortness of breath.    Cardiovascular: Negative for chest pain.   Gastrointestinal: Negative for abdominal pain and diarrhea.   Genitourinary: Negative for frequency.   Musculoskeletal: Negative for back pain.   Integumentary:  Negative for rash.   Neurological: Negative for headaches.   Hematological: Negative for adenopathy.   Psychiatric/Behavioral: The patient is not nervous/anxious.           Assessment:       Problem List Items Addressed This Visit    None       Oncology History    No history exists.            Family History   Problem Relation Age of Onset    Hypertension Mother     Breast cancer Mother     Hypertension Father     Leukemia Father     No Known Problems Sister     No Known Problems Brother     Breast cancer Maternal Grandmother     Alzheimer's disease Maternal Grandmother     Heart attack Maternal Grandfather     Heart disease Maternal Grandfather     Diabetes Paternal Grandmother     Dementia Paternal Grandmother     Alzheimer's disease Paternal Grandfather         Plan:   Genetic testing was appropriate for this patient because of her personal and family history. This comprehensive [facility] [employer] analysis indicated that there was no deleterious mutation and no variants of uncertain significance found in 36 genes with known associated to increased cancer risk: APC, RICKY AXIN2, BARD1, BRCA1, BRCA2, BMPR1A, BRIP1, CDH1, CDK4, CDKN2A, CHEK2, DICER1, HOXB13, EPCAM, GREM1, MLH1, MSH2, MSH6, MUTYH, NBN, NF12, PALB2, PMS2, POLD1, POLE, PTEN, RAD50, RECQL, RAD51C, RAD51D, SM4D4, SMARCA4, STK11, TP53.    It was explained to the patient, that, although this analysis indicated a negative result, there are other, rare genetic abnormalities that this test will not detect. This result, however, rules out the majority of abnormalities believed to be responsible for hereditary susceptibility to cancer.    A copy of her result will be emailed to [email or web address]     The patient location is: home    Visit type: audiovisual    Face to Face time with patient: 10 minutes  20 minutes of total time spent on the encounter, which includes face to face time and non-face to face time preparing to see the patient (eg, review of tests), Obtaining and/or reviewing separately obtained history, Documenting clinical information in the electronic or other health record,  Independently interpreting results (not separately reported) and communicating results to the patient/family/caregiver, or Care coordination (not separately reported).         Each patient to whom he or she provides medical services by telemedicine is:  (1) informed of the relationship between the physician and patient and the respective role of any other health care provider with respect to management of the patient; and (2) notified that he or she may decline to receive medical services by telemedicine and may withdraw from such care at any time.

## 2022-05-24 ENCOUNTER — OFFICE VISIT (OUTPATIENT)
Dept: HEMATOLOGY/ONCOLOGY | Facility: CLINIC | Age: 47
End: 2022-05-24
Payer: COMMERCIAL

## 2022-05-24 DIAGNOSIS — D05.11 DUCTAL CARCINOMA IN SITU (DCIS) OF RIGHT BREAST: Primary | ICD-10-CM

## 2022-05-24 DIAGNOSIS — Z80.3 FAMILY HISTORY OF BREAST CANCER: ICD-10-CM

## 2022-05-24 PROBLEM — D05.10 DUCTAL CARCINOMA IN SITU (DCIS) OF BREAST: Status: ACTIVE | Noted: 2022-05-24

## 2022-05-24 PROCEDURE — 4010F ACE/ARB THERAPY RXD/TAKEN: CPT | Mod: CPTII,95,, | Performed by: NURSE PRACTITIONER

## 2022-05-24 PROCEDURE — 99213 PR OFFICE/OUTPT VISIT, EST, LEVL III, 20-29 MIN: ICD-10-PCS | Mod: 95,,, | Performed by: NURSE PRACTITIONER

## 2022-05-24 PROCEDURE — 4010F PR ACE/ARB THEARPY RXD/TAKEN: ICD-10-PCS | Mod: CPTII,95,, | Performed by: NURSE PRACTITIONER

## 2022-05-24 PROCEDURE — 99213 OFFICE O/P EST LOW 20 MIN: CPT | Mod: 95,,, | Performed by: NURSE PRACTITIONER

## 2022-05-25 ENCOUNTER — OFFICE VISIT (OUTPATIENT)
Dept: SURGICAL ONCOLOGY | Facility: CLINIC | Age: 47
End: 2022-05-25
Payer: COMMERCIAL

## 2022-05-25 DIAGNOSIS — C50.911 MALIGNANT NEOPLASM OF RIGHT FEMALE BREAST, UNSPECIFIED ESTROGEN RECEPTOR STATUS, UNSPECIFIED SITE OF BREAST: Primary | ICD-10-CM

## 2022-05-25 PROCEDURE — 99024 PR POST-OP FOLLOW-UP VISIT: ICD-10-PCS | Mod: S$GLB,,, | Performed by: NURSE PRACTITIONER

## 2022-05-25 PROCEDURE — 1159F MED LIST DOCD IN RCRD: CPT | Mod: CPTII,S$GLB,, | Performed by: NURSE PRACTITIONER

## 2022-05-25 PROCEDURE — 99999 PR PBB SHADOW E&M-EST. PATIENT-LVL II: CPT | Mod: PBBFAC,,, | Performed by: NURSE PRACTITIONER

## 2022-05-25 PROCEDURE — 1159F PR MEDICATION LIST DOCUMENTED IN MEDICAL RECORD: ICD-10-PCS | Mod: CPTII,S$GLB,, | Performed by: NURSE PRACTITIONER

## 2022-05-25 PROCEDURE — 4010F PR ACE/ARB THEARPY RXD/TAKEN: ICD-10-PCS | Mod: CPTII,S$GLB,, | Performed by: NURSE PRACTITIONER

## 2022-05-25 PROCEDURE — 99024 POSTOP FOLLOW-UP VISIT: CPT | Mod: S$GLB,,, | Performed by: NURSE PRACTITIONER

## 2022-05-25 PROCEDURE — 4010F ACE/ARB THERAPY RXD/TAKEN: CPT | Mod: CPTII,S$GLB,, | Performed by: NURSE PRACTITIONER

## 2022-05-25 PROCEDURE — 99999 PR PBB SHADOW E&M-EST. PATIENT-LVL II: ICD-10-PCS | Mod: PBBFAC,,, | Performed by: NURSE PRACTITIONER

## 2022-05-25 NOTE — PROGRESS NOTES
POST OP RIGHT LUMPECTOMY  HX DCIS    PT DOING VERY WELL  NO PROBLEMS REPORTED    BREAST INCISION HEALING WELL  NO SIGNS OF INFECTION    PATH: DCIS, CLEAR MARGINS, DR GAONA REVIEWS    PATH DISCUSSED AND QUESTIONS ANSWERED  WILL MAKE REFERRAL TO MED ONC AND RAD ONC [facility]  WILL ARRANGE RIGHT SIDED MAMMO 6MONTHS    RTC 6 MONTHS

## 2022-06-06 ENCOUNTER — TELEPHONE (OUTPATIENT)
Dept: HEMATOLOGY/ONCOLOGY | Facility: CLINIC | Age: 47
End: 2022-06-06
Payer: COMMERCIAL

## 2022-06-06 NOTE — PROGRESS NOTES
Heme/Onc Progress Note    PATIENT: Glenis Roberson  MRN: [id number]  DATE: 6/7/2022  Chief Complaint: Patient presents for New Patient visit. No c/o.     Current Treatment:      Oncology History Overview Note   Patient underwent routine screening mammogram which showed calcifications and focal asymmetries in the right breast. Diagnostic imaging revealed calcifications in the upper outer quadrant and a 9 o'clock periareolar mass. Both areas were biopsied, the 9 o'clock subareolar mass showed no evidence of malignancy, results benign and concordant however the upper outer quadrant 9:00 posterior calcifications showed DCIS, grade 2 with focal comedonecrosis. The patient reports significant family history of breast cancer in her mother, her maternal grandmother, and several maternal great aunts. Her sister also has a history of ADH.     Ductal carcinoma in situ (DCIS) of breast   3/31/2022 Cancer Staged    Staging form: Breast, AJCC 8th Edition  - Clinical stage from 3/31/2022: Stage 0 (cTis (DCIS), cN0, cM0, G2, ER+, AL+, HER2: Not Assessed)     5/11/2022 Surgery    1. Right breast Ultrasound-guided wire localization and lumpectomy  2. Re-excision of the medial margin  3. Re-excision of the superior margin  4. Re-excision of the deep margin     DUCTAL CARCINOMA IN SITU, LOW GRADE SOLID TYPE (0.4 CM).   DCIS IS 0.7 CM FROM THE CLOSEST-LATERAL MARGIN.   PRIOR BIOPSY SITE.    MULTIPLE INTRADUCTAL PAPILLOMAS WITH FLORID DUCT EPITHELIAL HYPERPLASIA.     [2] RE-EXCISION OF SUPERIOR MARGIN:    ATYPICAL LOBULAR HYPERPLASIA.    FLORID DUCT EPITHELIAL HYPERPLASIA, USUAL TYPE.     [3] RE-EXCISION OF MEDIAL MARGIN:    FLORID DUCT EPITHELIAL HYPERPLASIA, USUAL TYPE.     [4] RE-EXCISION OF DEEP MARGIN:    FLORID DUCT EPITHELIAL HYPERPLASIA, USUAL TYPE.        5/16/2022 Cancer Staged    Staging form: Breast, AJCC 8th Edition  - Pathologic stage from 5/16/2022: Stage 0 (pTis (DCIS), cN0, cM0, G1, ER+, AL+, HER2: Not  Assessed)     5/24/2022 Genetic Testing    Genetic testing negative         06/07/2022  New patient visit 06/07/2022      Past Medical History:   Diagnosis Date    Cancer     Breast    Heart murmur     History of multiple miscarriages     Hypertension     Mitral valve prolapse     Mosaic Briceno syndrome     pt has seen Dr. Coleman.. last visit about 7 yrs ago.    Nasal fracture     PONV (postoperative nausea and vomiting)         Current Outpatient Medications:     ascorbic acid/vitamin E/biotin (HAIR, SKIN, NAILS WITH BIOTIN ORAL), Take 1 Dose by mouth nightly., Disp: , Rfl:     cyanocobalamin/cobamamide (B12 SL), Place 1,000 mcg under the tongue nightly., Disp: , Rfl:     lisinopriL (PRINIVIL,ZESTRIL) 20 MG tablet, Take 20 mg by mouth nightly., Disp: , Rfl:     multivit-minerals/folic acid (MULTIVITAMIN GUMMIES ORAL), Take 1 Dose by mouth nightly., Disp: , Rfl:     norethindrone (MICRONOR) 0.35 mg tablet, Take 1 tablet by mouth nightly., Disp: , Rfl:     ALPRAZolam (XANAX) 0.25 MG tablet, Take 0.25 mg by mouth 3 (three) times daily., Disp: , Rfl:     HYDROcodone-acetaminophen (NORCO) 5-325 mg per tablet, Take 1 tablet by mouth every 6 (six) hours as needed for Pain. (Patient not taking: Reported on 6/7/2022), Disp: 20 tablet, Rfl: 0     Review of Systems:   Pertinent positives and negatives included in the HPI. Otherwise a complete review of systems is negative.      Objective:     Vitals:    06/07/22 1355   BP: (!) 173/105   Pulse: 80   Temp: 98.4 °F (36.9 °C)         Physical Exam  Exam conducted with a chaperone present.   Constitutional:       General: She is in acute distress.      Appearance: Normal appearance. She is not ill-appearing or toxic-appearing.   HENT:      Head: Normocephalic and atraumatic.      Mouth/Throat:      Pharynx: Oropharynx is clear.   Eyes:      Extraocular Movements: Extraocular movements intact.      Conjunctiva/sclera: Conjunctivae normal.      Pupils: Pupils are  equal, round, and reactive to light.   Cardiovascular:      Rate and Rhythm: Normal rate and regular rhythm.      Pulses: Normal pulses.      Heart sounds: Normal heart sounds.   Pulmonary:      Effort: Pulmonary effort is normal.      Breath sounds: Normal breath sounds.   Chest:          Comments: Fibrocystic bilateral breast dense  Abdominal:      General: Abdomen is flat. Bowel sounds are normal.      Palpations: Abdomen is soft.   Musculoskeletal:         General: Normal range of motion.      Cervical back: Neck supple.   Skin:     General: Skin is warm and dry.      Capillary Refill: Capillary refill takes less than 2 seconds.   Neurological:      General: No focal deficit present.      Mental Status: She is alert and oriented to person, place, and time. Mental status is at baseline.      Gait: Gait normal.   Psychiatric:         Mood and Affect: Mood normal.         Behavior: Behavior normal.         Thought Content: Thought content normal.         Judgment: Judgment normal.         ECOG SCORE    0 - Fully active-able to carry on all pre-disease performance without restriction          Assessment and Plan     Problem List Items Addressed This Visit        Oncology    Ductal carcinoma in situ (DCIS) of breast - Primary    Current Assessment & Plan     47-year-old premenopausal female on progesterone, status post lumpectomy for DCIS.  Stage 0:  Adjuvant therapy indications:  Radiation, endocrine therapy  Goals adjuvant therapy curative preventative   Endocrine Therapy .  The side effects of anti-hormonal therapy were discussed.  Including but not limited to: Vasomotor hot flashes, dysuria, arthralgia, hyperlipidemia, osteopenia, osteoporosis. Hand outs given on medications and patient had the opportunity to ask questions.    Plan  Agree with adjuvant radiation  Discontinue progesterone  Follow-up with gyn  Return to clinic in 6-8 weeks with CBC, CMP, and then start adjuvant tamoxifen           Relevant Orders     CBC Auto Differential    Comprehensive Metabolic Panel      Other Visit Diagnoses     Malignant neoplasm of right female breast, unspecified estrogen receptor status, unspecified site of breast                Follow up in about 8 weeks (around 8/4/2022).

## 2022-06-07 ENCOUNTER — OFFICE VISIT (OUTPATIENT)
Dept: HEMATOLOGY/ONCOLOGY | Facility: CLINIC | Age: 47
End: 2022-06-07
Payer: COMMERCIAL

## 2022-06-07 ENCOUNTER — DOCUMENTATION ONLY (OUTPATIENT)
Dept: HEMATOLOGY/ONCOLOGY | Facility: CLINIC | Age: 47
End: 2022-06-07

## 2022-06-07 VITALS
BODY MASS INDEX: 28.93 KG/M2 | HEART RATE: 80 BPM | HEIGHT: 62 IN | WEIGHT: 157.19 LBS | TEMPERATURE: 98 F | DIASTOLIC BLOOD PRESSURE: 105 MMHG | SYSTOLIC BLOOD PRESSURE: 173 MMHG

## 2022-06-07 DIAGNOSIS — C50.911 MALIGNANT NEOPLASM OF RIGHT FEMALE BREAST, UNSPECIFIED ESTROGEN RECEPTOR STATUS, UNSPECIFIED SITE OF BREAST: ICD-10-CM

## 2022-06-07 DIAGNOSIS — D05.11 DUCTAL CARCINOMA IN SITU (DCIS) OF RIGHT BREAST: Primary | ICD-10-CM

## 2022-06-07 PROCEDURE — 1159F MED LIST DOCD IN RCRD: CPT | Mod: CPTII,S$GLB,, | Performed by: INTERNAL MEDICINE

## 2022-06-07 PROCEDURE — 4010F PR ACE/ARB THEARPY RXD/TAKEN: ICD-10-PCS | Mod: CPTII,S$GLB,, | Performed by: INTERNAL MEDICINE

## 2022-06-07 PROCEDURE — 3077F SYST BP >= 140 MM HG: CPT | Mod: CPTII,S$GLB,, | Performed by: INTERNAL MEDICINE

## 2022-06-07 PROCEDURE — 1160F PR REVIEW ALL MEDS BY PRESCRIBER/CLIN PHARMACIST DOCUMENTED: ICD-10-PCS | Mod: CPTII,S$GLB,, | Performed by: INTERNAL MEDICINE

## 2022-06-07 PROCEDURE — 1160F RVW MEDS BY RX/DR IN RCRD: CPT | Mod: CPTII,S$GLB,, | Performed by: INTERNAL MEDICINE

## 2022-06-07 PROCEDURE — 99999 PR PBB SHADOW E&M-EST. PATIENT-LVL IV: ICD-10-PCS | Mod: PBBFAC,,, | Performed by: INTERNAL MEDICINE

## 2022-06-07 PROCEDURE — 3080F PR MOST RECENT DIASTOLIC BLOOD PRESSURE >= 90 MM HG: ICD-10-PCS | Mod: CPTII,S$GLB,, | Performed by: INTERNAL MEDICINE

## 2022-06-07 PROCEDURE — 3008F PR BODY MASS INDEX (BMI) DOCUMENTED: ICD-10-PCS | Mod: CPTII,S$GLB,, | Performed by: INTERNAL MEDICINE

## 2022-06-07 PROCEDURE — 3080F DIAST BP >= 90 MM HG: CPT | Mod: CPTII,S$GLB,, | Performed by: INTERNAL MEDICINE

## 2022-06-07 PROCEDURE — 4010F ACE/ARB THERAPY RXD/TAKEN: CPT | Mod: CPTII,S$GLB,, | Performed by: INTERNAL MEDICINE

## 2022-06-07 PROCEDURE — 99204 OFFICE O/P NEW MOD 45 MIN: CPT | Mod: S$GLB,,, | Performed by: INTERNAL MEDICINE

## 2022-06-07 PROCEDURE — 99999 PR PBB SHADOW E&M-EST. PATIENT-LVL IV: CPT | Mod: PBBFAC,,, | Performed by: INTERNAL MEDICINE

## 2022-06-07 PROCEDURE — 1159F PR MEDICATION LIST DOCUMENTED IN MEDICAL RECORD: ICD-10-PCS | Mod: CPTII,S$GLB,, | Performed by: INTERNAL MEDICINE

## 2022-06-07 PROCEDURE — 3008F BODY MASS INDEX DOCD: CPT | Mod: CPTII,S$GLB,, | Performed by: INTERNAL MEDICINE

## 2022-06-07 PROCEDURE — 3077F PR MOST RECENT SYSTOLIC BLOOD PRESSURE >= 140 MM HG: ICD-10-PCS | Mod: CPTII,S$GLB,, | Performed by: INTERNAL MEDICINE

## 2022-06-07 PROCEDURE — 99204 PR OFFICE/OUTPT VISIT, NEW, LEVL IV, 45-59 MIN: ICD-10-PCS | Mod: S$GLB,,, | Performed by: INTERNAL MEDICINE

## 2022-06-07 RX ORDER — ALPRAZOLAM 0.25 MG/1
0.25 TABLET ORAL 3 TIMES DAILY
COMMUNITY
Start: 2022-04-05 | End: 2022-11-20

## 2022-06-07 NOTE — NURSING
I met with patient following her appointment with Dr. Bradford. We discussed the plan of care and my role here at [facility]. She agreed to reach out to me if any needs arise.  Oncology Navigation   Intake  Date of Diagnosis: 5/16/2022  Cancer Type: Breast  Date Worked: 6/7/2022     Treatment  Current Status: Active    Surgery: Completed  Type of Surgery: Lumpectomy  Surgery Schedule Date: 5/11/2022    Medical Oncologist: Manuel Bradford MD  Consult Date: 6/7/2022  Oral Therapy: Planned    Radiation Therapy: Planned  Radiation Oncologist: Nicole Ortiz MD    Procedures: Biopsy; Mammogram       ER: Positive  UT: Positive    Radiation Oncologist: Nicole Ortiz MD        Acuity      Follow Up  No follow-ups on file.

## 2022-06-07 NOTE — ASSESSMENT & PLAN NOTE
47-year-old premenopausal female on progesterone, status post lumpectomy for DCIS.  Stage 0:  Adjuvant therapy indications:  Radiation, endocrine therapy  Goals adjuvant therapy curative preventative   Endocrine Therapy .  The side effects of anti-hormonal therapy were discussed.  Including but not limited to: Vasomotor hot flashes, dysuria, arthralgia, hyperlipidemia, osteopenia, osteoporosis. Hand outs given on medications and patient had the opportunity to ask questions.    Plan  Agree with adjuvant radiation  Discontinue progesterone  Follow-up with gyn  Return to clinic in 6-8 weeks with CBC, CMP, and then start adjuvant tamoxifen

## 2022-06-09 ENCOUNTER — OFFICE VISIT (OUTPATIENT)
Dept: RADIATION THERAPY | Facility: HOSPITAL | Age: 47
End: 2022-06-09
Attending: RADIOLOGY
Payer: COMMERCIAL

## 2022-06-09 DIAGNOSIS — C50.911 MALIGNANT NEOPLASM OF RIGHT FEMALE BREAST, UNSPECIFIED ESTROGEN RECEPTOR STATUS, UNSPECIFIED SITE OF BREAST: ICD-10-CM

## 2022-06-09 PROCEDURE — 77334 RADIATION TREATMENT AID(S): CPT | Performed by: RADIOLOGY

## 2022-06-09 PROCEDURE — 77290 THER RAD SIMULAJ FIELD CPLX: CPT | Performed by: RADIOLOGY

## 2022-06-16 ENCOUNTER — APPOINTMENT (OUTPATIENT)
Dept: LAB | Facility: HOSPITAL | Age: 47
End: 2022-06-16
Attending: RADIOLOGY
Payer: COMMERCIAL

## 2022-06-16 DIAGNOSIS — D05.11 INTRADUCTAL CARCINOMA IN SITU OF RIGHT BREAST: Primary | ICD-10-CM

## 2022-06-16 LAB — B-HCG SERPL QL: NEGATIVE

## 2022-06-16 PROCEDURE — 81025 URINE PREGNANCY TEST: CPT

## 2022-06-27 PROCEDURE — 77336 RADIATION PHYSICS CONSULT: CPT | Performed by: RADIOLOGY

## 2022-07-01 ENCOUNTER — APPOINTMENT (OUTPATIENT)
Dept: RADIATION THERAPY | Facility: HOSPITAL | Age: 47
End: 2022-07-01
Attending: RADIOLOGY
Payer: COMMERCIAL

## 2022-07-01 PROCEDURE — 77412 RADIATION TX DELIVERY LVL 3: CPT | Performed by: RADIOLOGY

## 2022-07-01 PROCEDURE — 77387 GUIDANCE FOR RADJ TX DLVR: CPT | Performed by: RADIOLOGY

## 2022-07-05 PROCEDURE — 77412 RADIATION TX DELIVERY LVL 3: CPT | Performed by: RADIOLOGY

## 2022-07-05 PROCEDURE — 77387 GUIDANCE FOR RADJ TX DLVR: CPT | Performed by: RADIOLOGY

## 2022-07-05 PROCEDURE — 77336 RADIATION PHYSICS CONSULT: CPT | Performed by: RADIOLOGY

## 2022-07-06 PROCEDURE — 77387 GUIDANCE FOR RADJ TX DLVR: CPT | Performed by: RADIOLOGY

## 2022-07-06 PROCEDURE — 77412 RADIATION TX DELIVERY LVL 3: CPT | Performed by: RADIOLOGY

## 2022-07-07 PROCEDURE — 77412 RADIATION TX DELIVERY LVL 3: CPT | Performed by: RADIOLOGY

## 2022-07-07 PROCEDURE — 77387 GUIDANCE FOR RADJ TX DLVR: CPT | Performed by: RADIOLOGY

## 2022-07-08 PROCEDURE — 77387 GUIDANCE FOR RADJ TX DLVR: CPT | Performed by: RADIOLOGY

## 2022-07-08 PROCEDURE — 77412 RADIATION TX DELIVERY LVL 3: CPT | Performed by: RADIOLOGY

## 2022-07-11 PROCEDURE — 77336 RADIATION PHYSICS CONSULT: CPT | Performed by: RADIOLOGY

## 2022-07-11 PROCEDURE — 77387 GUIDANCE FOR RADJ TX DLVR: CPT | Performed by: RADIOLOGY

## 2022-07-11 PROCEDURE — 77412 RADIATION TX DELIVERY LVL 3: CPT | Performed by: RADIOLOGY

## 2022-07-12 PROCEDURE — 77412 RADIATION TX DELIVERY LVL 3: CPT | Performed by: RADIOLOGY

## 2022-07-12 PROCEDURE — 77280 THER RAD SIMULAJ FIELD SMPL: CPT | Performed by: RADIOLOGY

## 2022-07-13 PROCEDURE — 77412 RADIATION TX DELIVERY LVL 3: CPT | Performed by: RADIOLOGY

## 2022-07-14 PROCEDURE — 77412 RADIATION TX DELIVERY LVL 3: CPT | Performed by: RADIOLOGY

## 2022-07-15 PROCEDURE — 77412 RADIATION TX DELIVERY LVL 3: CPT | Performed by: RADIOLOGY

## 2022-07-18 PROCEDURE — 77336 RADIATION PHYSICS CONSULT: CPT | Performed by: RADIOLOGY

## 2022-07-18 PROCEDURE — 77412 RADIATION TX DELIVERY LVL 3: CPT | Performed by: RADIOLOGY

## 2022-07-29 ENCOUNTER — LAB VISIT (OUTPATIENT)
Dept: LAB | Facility: HOSPITAL | Age: 47
End: 2022-07-29
Attending: INTERNAL MEDICINE
Payer: COMMERCIAL

## 2022-07-29 DIAGNOSIS — D05.11 DUCTAL CARCINOMA IN SITU (DCIS) OF RIGHT BREAST: ICD-10-CM

## 2022-07-29 LAB
ALBUMIN SERPL-MCNC: 4.3 GM/DL (ref 3.5–5)
ALBUMIN/GLOB SERPL: 1.2 RATIO (ref 1.1–2)
ALP SERPL-CCNC: 52 UNIT/L (ref 40–150)
ALT SERPL-CCNC: 19 UNIT/L (ref 0–55)
AST SERPL-CCNC: 18 UNIT/L (ref 5–34)
BASOPHILS # BLD AUTO: 0.05 X10(3)/MCL (ref 0–0.2)
BASOPHILS NFR BLD AUTO: 0.8 %
BILIRUBIN DIRECT+TOT PNL SERPL-MCNC: 0.6 MG/DL
BUN SERPL-MCNC: 13.5 MG/DL (ref 7–18.7)
CALCIUM SERPL-MCNC: 9.9 MG/DL (ref 8.4–10.2)
CHLORIDE SERPL-SCNC: 107 MMOL/L (ref 98–107)
CO2 SERPL-SCNC: 26 MMOL/L (ref 22–29)
CREAT SERPL-MCNC: 0.81 MG/DL (ref 0.55–1.02)
EOSINOPHIL # BLD AUTO: 0.11 X10(3)/MCL (ref 0–0.9)
EOSINOPHIL NFR BLD AUTO: 1.8 %
ERYTHROCYTE [DISTWIDTH] IN BLOOD BY AUTOMATED COUNT: 12.4 % (ref 11.5–17)
GLOBULIN SER-MCNC: 3.6 GM/DL (ref 2.4–3.5)
GLUCOSE SERPL-MCNC: 124 MG/DL (ref 74–100)
HCT VFR BLD AUTO: 40.6 % (ref 37–47)
HGB BLD-MCNC: 13.2 GM/DL (ref 12–16)
IMM GRANULOCYTES # BLD AUTO: 0.02 X10(3)/MCL (ref 0–0.04)
IMM GRANULOCYTES NFR BLD AUTO: 0.3 %
LYMPHOCYTES # BLD AUTO: 1.27 X10(3)/MCL (ref 0.6–4.6)
LYMPHOCYTES NFR BLD AUTO: 20.8 %
MCH RBC QN AUTO: 31.6 PG (ref 27–31)
MCHC RBC AUTO-ENTMCNC: 32.5 MG/DL (ref 33–36)
MCV RBC AUTO: 97.1 FL (ref 80–94)
MONOCYTES # BLD AUTO: 0.77 X10(3)/MCL (ref 0.1–1.3)
MONOCYTES NFR BLD AUTO: 12.6 %
NEUTROPHILS # BLD AUTO: 3.9 X10(3)/MCL (ref 2.1–9.2)
NEUTROPHILS NFR BLD AUTO: 63.7 %
PLATELET # BLD AUTO: 253 X10(3)/MCL (ref 130–400)
PMV BLD AUTO: 9.9 FL (ref 7.4–10.4)
POTASSIUM SERPL-SCNC: 4 MMOL/L (ref 3.5–5.1)
PROT SERPL-MCNC: 7.9 GM/DL (ref 6.4–8.3)
RBC # BLD AUTO: 4.18 X10(6)/MCL (ref 4.2–5.4)
SODIUM SERPL-SCNC: 140 MMOL/L (ref 136–145)
WBC # SPEC AUTO: 6.1 X10(3)/MCL (ref 4.5–11.5)

## 2022-07-29 PROCEDURE — 36415 COLL VENOUS BLD VENIPUNCTURE: CPT

## 2022-07-29 PROCEDURE — 80053 COMPREHEN METABOLIC PANEL: CPT

## 2022-07-29 PROCEDURE — 85025 COMPLETE CBC W/AUTO DIFF WBC: CPT

## 2022-08-02 NOTE — ASSESSMENT & PLAN NOTE
47-year-old premenopausal female on progesterone, status post lumpectomy for DCIS.  Stage 0:  Adjuvant therapy indications:  Radiation, endocrine therapy  Goals adjuvant therapy curative preventative   Endocrine Therapy .  The side effects of anti-hormonal therapy were discussed.  Including but not limited to: Vasomotor hot flashes, dysuria, arthralgia, hyperlipidemia, osteopenia, osteoporosis. Hand outs given on medications and patient had the opportunity to ask questions.    Plan  Continue follow-up primary care on blood pressure  Avoid pregnancy  Risk of birth defects discussed about tamoxifen  Avoidance of hormones    Return to clinic in 6-8 weeks with  CMP, UPT    start adjuvant tamoxifen

## 2022-08-02 NOTE — PROGRESS NOTES
Heme/Onc Progress Note    PATIENT: Glenis Roberson  MRN: [id number]  DATE: 8/3/2022  Chief Complaint: Follow-up (8 week f/u)    Current Treatment:      Oncology History Overview Note   Patient underwent routine screening mammogram which showed calcifications and focal asymmetries in the right breast. Diagnostic imaging revealed calcifications in the upper outer quadrant and a 9 o'clock periareolar mass. Both areas were biopsied, the 9 o'clock subareolar mass showed no evidence of malignancy, results benign and concordant however the upper outer quadrant 9:00 posterior calcifications showed DCIS, grade 2 with focal comedonecrosis. The patient reports significant family history of breast cancer in her mother, her maternal grandmother, and several maternal great aunts. Her sister also has a history of ADH.     Ductal carcinoma in situ (DCIS) of breast   3/31/2022 Cancer Staged    Staging form: Breast, AJCC 8th Edition  - Clinical stage from 3/31/2022: Stage 0 (cTis (DCIS), cN0, cM0, G2, ER+, AZ+, HER2: Not Assessed)     5/11/2022 Surgery    1. Right breast Ultrasound-guided wire localization and lumpectomy  2. Re-excision of the medial margin  3. Re-excision of the superior margin  4. Re-excision of the deep margin     DUCTAL CARCINOMA IN SITU, LOW GRADE SOLID TYPE (0.4 CM).   DCIS IS 0.7 CM FROM THE CLOSEST-LATERAL MARGIN.   PRIOR BIOPSY SITE.    MULTIPLE INTRADUCTAL PAPILLOMAS WITH FLORID DUCT EPITHELIAL HYPERPLASIA.     [2] RE-EXCISION OF SUPERIOR MARGIN:    ATYPICAL LOBULAR HYPERPLASIA.    FLORID DUCT EPITHELIAL HYPERPLASIA, USUAL TYPE.     [3] RE-EXCISION OF MEDIAL MARGIN:    FLORID DUCT EPITHELIAL HYPERPLASIA, USUAL TYPE.     [4] RE-EXCISION OF DEEP MARGIN:    FLORID DUCT EPITHELIAL HYPERPLASIA, USUAL TYPE.        5/16/2022 Cancer Staged    Staging form: Breast, AJCC 8th Edition  - Pathologic stage from 5/16/2022: Stage 0 (pTis (DCIS), cN0, cM0, G1, ER+, AZ+, HER2: Not Assessed)     5/24/2022 Genetic  Testing    Genetic testing negative         08/03/2022  She is here for follow-up.  She has completed her radiation.  She still has some skin darkening.  Besides some mild fatigue she has no other symptoms.  She still on monthly cycles.  She stopped all her birth control pills.  She has no sore throat, no cough or shortness of breath.  She does have occasional cough from her lisinopril.  But no progressive cough.  She has noticed no breast discharge or pain.  She does have some darkening of the skin      Past Medical History:   Diagnosis Date    Cancer     Breast    Heart murmur     History of multiple miscarriages     Hypertension     Mitral valve prolapse     Mosaic Briceno syndrome     pt has seen Dr. Coleman.. last visit about 7 yrs ago.    Nasal fracture     PONV (postoperative nausea and vomiting)         Current Outpatient Medications:     ALPRAZolam (XANAX) 0.25 MG tablet, Take 0.25 mg by mouth 3 (three) times daily., Disp: , Rfl:     ascorbic acid/vitamin E/biotin (HAIR, SKIN, NAILS WITH BIOTIN ORAL), Take 1 Dose by mouth nightly., Disp: , Rfl:     cyanocobalamin/cobamamide (B12 SL), Place 1,000 mcg under the tongue nightly., Disp: , Rfl:     lisinopriL (PRINIVIL,ZESTRIL) 20 MG tablet, take one tablet by mouth once daily, Disp: 90 tablet, Rfl: 3    multivit-minerals/folic acid (MULTIVITAMIN GUMMIES ORAL), Take 1 Dose by mouth nightly., Disp: , Rfl:     tamoxifen (NOLVADEX) 20 MG Tab, Take 1 tablet (20 mg total) by mouth once daily., Disp: 30 tablet, Rfl: 2     Review of Systems:   Pertinent positives and negatives included in the HPI. Otherwise a complete review of systems is negative.      Objective:     Vitals:    08/03/22 1118   BP: (!) 178/90   Pulse: 80   Temp: 98.1 °F (36.7 °C)         Physical Exam  Constitutional:       General: She is not in acute distress.     Appearance: Normal appearance. She is normal weight. She is not ill-appearing or toxic-appearing.   HENT:      Head:  Normocephalic and atraumatic.      Mouth/Throat:      Pharynx: Oropharynx is clear.   Eyes:      Extraocular Movements: Extraocular movements intact.      Conjunctiva/sclera: Conjunctivae normal.      Pupils: Pupils are equal, round, and reactive to light.   Cardiovascular:      Rate and Rhythm: Normal rate and regular rhythm.      Pulses: Normal pulses.      Heart sounds: Normal heart sounds.   Pulmonary:      Effort: Pulmonary effort is normal.      Breath sounds: Normal breath sounds.   Chest:   Breasts:      Right: No axillary adenopathy.      Left: No axillary adenopathy.            Comments: Darkening of the skin, formal palpation of the breast not done due to tenderness  Abdominal:      General: Abdomen is flat. Bowel sounds are normal.      Palpations: Abdomen is soft.   Musculoskeletal:         General: Normal range of motion.      Cervical back: Neck supple.   Lymphadenopathy:      Upper Body:      Right upper body: No axillary adenopathy.      Left upper body: No axillary adenopathy.   Skin:     General: Skin is warm and dry.      Capillary Refill: Capillary refill takes less than 2 seconds.   Neurological:      General: No focal deficit present.      Mental Status: She is alert and oriented to person, place, and time. Mental status is at baseline.   Psychiatric:         Mood and Affect: Mood normal.         Behavior: Behavior normal.         Thought Content: Thought content normal.         Judgment: Judgment normal.         ECOG 0     Lab Visit on 07/29/2022   Component Date Value Ref Range Status    Sodium Level 07/29/2022 140  136 - 145 mmol/L Final    Potassium Level 07/29/2022 4.0  3.5 - 5.1 mmol/L Final    Chloride 07/29/2022 107  98 - 107 mmol/L Final    Carbon Dioxide 07/29/2022 26  22 - 29 mmol/L Final    Glucose Level 07/29/2022 124 (A) 74 - 100 mg/dL Final    Blood Urea Nitrogen 07/29/2022 13.5  7.0 - 18.7 mg/dL Final    Creatinine 07/29/2022 0.81  0.55 - 1.02 mg/dL Final    Calcium  Level Total 07/29/2022 9.9  8.4 - 10.2 mg/dL Final    Protein Total 07/29/2022 7.9  6.4 - 8.3 gm/dL Final    Albumin Level 07/29/2022 4.3  3.5 - 5.0 gm/dL Final    Globulin 07/29/2022 3.6 (A) 2.4 - 3.5 gm/dL Final    Albumin/Globulin Ratio 07/29/2022 1.2  1.1 - 2.0 ratio Final    Bilirubin Total 07/29/2022 0.6  <=1.5 mg/dL Final    Alkaline Phosphatase 07/29/2022 52  40 - 150 unit/L Final    Alanine Aminotransferase 07/29/2022 19  0 - 55 unit/L Final    Aspartate Aminotransferase 07/29/2022 18  5 - 34 unit/L Final    Estimated GFR-Non  07/29/2022 >60  mls/min/1.73/m2 Final    WBC 07/29/2022 6.1  4.5 - 11.5 x10(3)/mcL Final    RBC 07/29/2022 4.18 (A) 4.20 - 5.40 x10(6)/mcL Final    Hgb 07/29/2022 13.2  12.0 - 16.0 gm/dL Final    Hct 07/29/2022 40.6  37.0 - 47.0 % Final    MCV 07/29/2022 97.1 (A) 80.0 - 94.0 fL Final    MCH 07/29/2022 31.6 (A) 27.0 - 31.0 pg Final    MCHC 07/29/2022 32.5 (A) 33.0 - 36.0 mg/dL Final    RDW 07/29/2022 12.4  11.5 - 17.0 % Final    Platelet 07/29/2022 253  130 - 400 x10(3)/mcL Final    MPV 07/29/2022 9.9  7.4 - 10.4 fL Final    Neut % 07/29/2022 63.7  % Final    Lymph % 07/29/2022 20.8  % Final    Mono % 07/29/2022 12.6  % Final    Eos % 07/29/2022 1.8  % Final    Basophil % 07/29/2022 0.8  % Final    Lymph # 07/29/2022 1.27  0.6 - 4.6 x10(3)/mcL Final    Neut # 07/29/2022 3.9  2.1 - 9.2 x10(3)/mcL Final    Mono # 07/29/2022 0.77  0.1 - 1.3 x10(3)/mcL Final    Eos # 07/29/2022 0.11  0 - 0.9 x10(3)/mcL Final    Baso # 07/29/2022 0.05  0 - 0.2 x10(3)/mcL Final    IG# 07/29/2022 0.02  0 - 0.04 x10(3)/mcL Final    IG% 07/29/2022 0.3  % Final         Assessment and Plan     Problem List Items Addressed This Visit        Oncology    Ductal carcinoma in situ (DCIS) of breast - Primary    Current Assessment & Plan     47-year-old premenopausal female on progesterone, status post lumpectomy for DCIS.  Stage 0:  Adjuvant therapy indications:   Radiation, endocrine therapy  Goals adjuvant therapy curative preventative   Endocrine Therapy .  The side effects of anti-hormonal therapy were discussed.  Including but not limited to: Vasomotor hot flashes, dysuria, arthralgia, hyperlipidemia, osteopenia, osteoporosis. Hand outs given on medications and patient had the opportunity to ask questions.    Plan  Continue follow-up primary care on blood pressure  Avoid pregnancy  Risk of birth defects discussed about tamoxifen  Avoidance of hormones    Return to clinic in 6-8 weeks with  CMP, UPT    start adjuvant tamoxifen           Relevant Medications    tamoxifen (NOLVADEX) 20 MG Tab    Other Relevant Orders    POCT urine pregnancy    Comprehensive Metabolic Panel            Follow up in about 6 weeks (around 9/14/2022).

## 2022-08-03 ENCOUNTER — OFFICE VISIT (OUTPATIENT)
Dept: HEMATOLOGY/ONCOLOGY | Facility: CLINIC | Age: 47
End: 2022-08-03
Payer: COMMERCIAL

## 2022-08-03 VITALS
TEMPERATURE: 98 F | DIASTOLIC BLOOD PRESSURE: 90 MMHG | WEIGHT: 153.69 LBS | HEART RATE: 80 BPM | BODY MASS INDEX: 28.1 KG/M2 | OXYGEN SATURATION: 100 % | SYSTOLIC BLOOD PRESSURE: 178 MMHG

## 2022-08-03 DIAGNOSIS — D05.11 DUCTAL CARCINOMA IN SITU (DCIS) OF RIGHT BREAST: Primary | ICD-10-CM

## 2022-08-03 PROCEDURE — 3080F PR MOST RECENT DIASTOLIC BLOOD PRESSURE >= 90 MM HG: ICD-10-PCS | Mod: CPTII,S$GLB,, | Performed by: INTERNAL MEDICINE

## 2022-08-03 PROCEDURE — 99999 PR PBB SHADOW E&M-EST. PATIENT-LVL III: ICD-10-PCS | Mod: PBBFAC,,, | Performed by: INTERNAL MEDICINE

## 2022-08-03 PROCEDURE — 3080F DIAST BP >= 90 MM HG: CPT | Mod: CPTII,S$GLB,, | Performed by: INTERNAL MEDICINE

## 2022-08-03 PROCEDURE — 3077F PR MOST RECENT SYSTOLIC BLOOD PRESSURE >= 140 MM HG: ICD-10-PCS | Mod: CPTII,S$GLB,, | Performed by: INTERNAL MEDICINE

## 2022-08-03 PROCEDURE — 99214 OFFICE O/P EST MOD 30 MIN: CPT | Mod: S$GLB,,, | Performed by: INTERNAL MEDICINE

## 2022-08-03 PROCEDURE — 1159F MED LIST DOCD IN RCRD: CPT | Mod: CPTII,S$GLB,, | Performed by: INTERNAL MEDICINE

## 2022-08-03 PROCEDURE — 1160F RVW MEDS BY RX/DR IN RCRD: CPT | Mod: CPTII,S$GLB,, | Performed by: INTERNAL MEDICINE

## 2022-08-03 PROCEDURE — 4010F ACE/ARB THERAPY RXD/TAKEN: CPT | Mod: CPTII,S$GLB,, | Performed by: INTERNAL MEDICINE

## 2022-08-03 PROCEDURE — 4010F PR ACE/ARB THEARPY RXD/TAKEN: ICD-10-PCS | Mod: CPTII,S$GLB,, | Performed by: INTERNAL MEDICINE

## 2022-08-03 PROCEDURE — 99999 PR PBB SHADOW E&M-EST. PATIENT-LVL III: CPT | Mod: PBBFAC,,, | Performed by: INTERNAL MEDICINE

## 2022-08-03 PROCEDURE — 3008F PR BODY MASS INDEX (BMI) DOCUMENTED: ICD-10-PCS | Mod: CPTII,S$GLB,, | Performed by: INTERNAL MEDICINE

## 2022-08-03 PROCEDURE — 3008F BODY MASS INDEX DOCD: CPT | Mod: CPTII,S$GLB,, | Performed by: INTERNAL MEDICINE

## 2022-08-03 PROCEDURE — 99214 PR OFFICE/OUTPT VISIT, EST, LEVL IV, 30-39 MIN: ICD-10-PCS | Mod: S$GLB,,, | Performed by: INTERNAL MEDICINE

## 2022-08-03 PROCEDURE — 3077F SYST BP >= 140 MM HG: CPT | Mod: CPTII,S$GLB,, | Performed by: INTERNAL MEDICINE

## 2022-08-03 PROCEDURE — 1159F PR MEDICATION LIST DOCUMENTED IN MEDICAL RECORD: ICD-10-PCS | Mod: CPTII,S$GLB,, | Performed by: INTERNAL MEDICINE

## 2022-08-03 PROCEDURE — 1160F PR REVIEW ALL MEDS BY PRESCRIBER/CLIN PHARMACIST DOCUMENTED: ICD-10-PCS | Mod: CPTII,S$GLB,, | Performed by: INTERNAL MEDICINE

## 2022-08-03 RX ORDER — TAMOXIFEN CITRATE 20 MG/1
20 TABLET ORAL DAILY
Qty: 30 TABLET | Refills: 2 | Status: SHIPPED | OUTPATIENT
Start: 2022-08-03 | End: 2022-10-04

## 2022-08-09 ENCOUNTER — DOCUMENTATION ONLY (OUTPATIENT)
Dept: ADMINISTRATIVE | Facility: HOSPITAL | Age: 47
End: 2022-08-09
Payer: COMMERCIAL

## 2022-08-25 ENCOUNTER — HOSPITAL ENCOUNTER (OUTPATIENT)
Dept: RADIOLOGY | Facility: HOSPITAL | Age: 47
Discharge: HOME | End: 2022-08-25
Attending: NURSE PRACTITIONER
Payer: COMMERCIAL

## 2022-08-25 VITALS — WEIGHT: 155 LBS | HEIGHT: 62 IN | BODY MASS INDEX: 28.52 KG/M2

## 2022-08-25 DIAGNOSIS — C50.911 MALIGNANT NEOPLASM OF RIGHT FEMALE BREAST, UNSPECIFIED ESTROGEN RECEPTOR STATUS, UNSPECIFIED SITE OF BREAST: ICD-10-CM

## 2022-08-25 DIAGNOSIS — R92.8 ABNORMAL MAMMOGRAM: ICD-10-CM

## 2022-08-25 PROCEDURE — 77061 MAMMO DIGITAL DIAGNOSTIC RIGHT WITH TOMO: ICD-10-PCS | Mod: 26,RT,, | Performed by: STUDENT IN AN ORGANIZED HEALTH CARE EDUCATION/TRAINING PROGRAM

## 2022-08-25 PROCEDURE — 76641 ULTRASOUND BREAST COMPLETE: CPT | Mod: TC,RT

## 2022-08-25 PROCEDURE — 77061 BREAST TOMOSYNTHESIS UNI: CPT | Mod: 26,RT,, | Performed by: STUDENT IN AN ORGANIZED HEALTH CARE EDUCATION/TRAINING PROGRAM

## 2022-08-25 PROCEDURE — 77065 MAMMO DIGITAL DIAGNOSTIC RIGHT WITH TOMO: ICD-10-PCS | Mod: 26,RT,, | Performed by: STUDENT IN AN ORGANIZED HEALTH CARE EDUCATION/TRAINING PROGRAM

## 2022-08-25 PROCEDURE — 77065 DX MAMMO INCL CAD UNI: CPT | Mod: TC,RT

## 2022-08-25 PROCEDURE — 76641 US BREAST RIGHT COMPLETE: ICD-10-PCS | Mod: 26,RT,, | Performed by: STUDENT IN AN ORGANIZED HEALTH CARE EDUCATION/TRAINING PROGRAM

## 2022-08-25 PROCEDURE — 76641 ULTRASOUND BREAST COMPLETE: CPT | Mod: 26,RT,, | Performed by: STUDENT IN AN ORGANIZED HEALTH CARE EDUCATION/TRAINING PROGRAM

## 2022-08-25 PROCEDURE — 77065 DX MAMMO INCL CAD UNI: CPT | Mod: 26,RT,, | Performed by: STUDENT IN AN ORGANIZED HEALTH CARE EDUCATION/TRAINING PROGRAM

## 2022-09-13 NOTE — ASSESSMENT & PLAN NOTE
· Keep appt for biopsy on 9/26/22  · Hold tamoxifen for now  · Continue follow-up primary care on blood pressure  · Avoid pregnancy  · Avoidance of hormones

## 2022-09-13 NOTE — PROGRESS NOTES
Heme/Onc Progress Note    PATIENT: Glenis Roberson  MRN: [id number]  DATE: 9/14/2022  Chief Complaint: Follow-up (6 week f/u. Had a MMG done on 8/25 )      Referring Provider: Dr. Zarate  Current Treatment:  Tamoxifen 8/03/22    Oncology History Overview Note   Patient underwent routine screening mammogram which showed calcifications and focal asymmetries in the right breast. Diagnostic imaging revealed calcifications in the upper outer quadrant and a 9 o'clock periareolar mass. Both areas were biopsied, the 9 o'clock subareolar mass showed no evidence of malignancy, results benign and concordant however the upper outer quadrant 9:00 posterior calcifications showed DCIS, grade 2 with focal comedonecrosis. The patient reports significant family history of breast cancer in her mother, her maternal grandmother, and several maternal great aunts. Her sister also has a history of ADH.     Ductal carcinoma in situ (DCIS) of breast   3/31/2022 Cancer Staged    Staging form: Breast, AJCC 8th Edition  - Clinical stage from 3/31/2022: Stage 0 (cTis (DCIS), cN0, cM0, G2, ER+, CA+, HER2: Not Assessed)       5/11/2022 Surgery    1. Right breast Ultrasound-guided wire localization and lumpectomy  2. Re-excision of the medial margin  3. Re-excision of the superior margin  4. Re-excision of the deep margin    DUCTAL CARCINOMA IN SITU, LOW GRADE SOLID TYPE (0.4 CM).  DCIS IS 0.7 CM FROM THE CLOSEST-LATERAL MARGIN.  PRIOR BIOPSY SITE.   MULTIPLE INTRADUCTAL PAPILLOMAS WITH FLORID DUCT EPITHELIAL HYPERPLASIA.     [2] RE-EXCISION OF SUPERIOR MARGIN:   ATYPICAL LOBULAR HYPERPLASIA.   FLORID DUCT EPITHELIAL HYPERPLASIA, USUAL TYPE.     [3] RE-EXCISION OF MEDIAL MARGIN:   FLORID DUCT EPITHELIAL HYPERPLASIA, USUAL TYPE.     [4] RE-EXCISION OF DEEP MARGIN:   FLORID DUCT EPITHELIAL HYPERPLASIA, USUAL TYPE.        5/24/2022 Genetic Testing    Genetic testing negative      Hormone Therapy         8/3/2022 -  Hormone Therapy    Tamoxifen          09/14/2022  Patient presents today for follow-up after starting tamoxifen.  She does notice a difference in her energy level and mood while on tamoxifen.  She denies any vaginal bleeding, however she still continues to have monthly cycles.  Her recent right diagnostic mammogram showed regional coarse heterogeneous calcifications that spanned 8 x 7 x 8 cm inner suspicious.  The calcifications extend to within 1.5 cm from the base of the nipple.  BI-RADS category 4 follow-up ultrasound of this area again showed T-scores heterogeneous calcifications with the span as mentioned above BI-RADS 4 suspicious.  She has a stereotactic tomosynthesis guided core needle biopsy of that right breast area scheduled for September 26th.      Past Medical History:   Diagnosis Date    Cancer     Breast    Ductal carcinoma in situ (DCIS) of breast 5/24/2022    Heart murmur     History of multiple miscarriages     Hypertension     Mitral valve prolapse     Mosaic Briceno syndrome     pt has seen Dr. Coleman.. last visit about 7 yrs ago.    Nasal fracture     PONV (postoperative nausea and vomiting)         Current Outpatient Medications:     ALPRAZolam (XANAX) 0.25 MG tablet, Take 0.25 mg by mouth 3 (three) times daily., Disp: , Rfl:     ascorbic acid/vitamin E/biotin (HAIR, SKIN, NAILS WITH BIOTIN ORAL), Take 1 Dose by mouth nightly., Disp: , Rfl:     cyanocobalamin/cobamamide (B12 SL), Place 1,000 mcg under the tongue nightly., Disp: , Rfl:     lisinopriL (PRINIVIL,ZESTRIL) 20 MG tablet, take one tablet by mouth once daily, Disp: 90 tablet, Rfl: 3    multivit-minerals/folic acid (MULTIVITAMIN GUMMIES ORAL), Take 1 Dose by mouth nightly., Disp: , Rfl:     tamoxifen (NOLVADEX) 20 MG Tab, Take 1 tablet (20 mg total) by mouth once daily., Disp: 30 tablet, Rfl: 2     Review of Systems:   Pertinent positives and negatives included in the HPI. Otherwise a complete review of systems is negative.      Objective:     Vitals:    09/14/22 1117    BP: (!) 148/73   Pulse: 82   Temp: 98.2 °F (36.8 °C)         Physical Exam  Constitutional:       General: She is not in acute distress.     Appearance: Normal appearance. She is normal weight. She is not ill-appearing or toxic-appearing.   HENT:      Head: Normocephalic and atraumatic.      Mouth/Throat:      Pharynx: Oropharynx is clear.   Eyes:      Extraocular Movements: Extraocular movements intact.      Conjunctiva/sclera: Conjunctivae normal.      Pupils: Pupils are equal, round, and reactive to light.   Cardiovascular:      Rate and Rhythm: Normal rate and regular rhythm.      Pulses: Normal pulses.      Heart sounds: Normal heart sounds.   Pulmonary:      Effort: Pulmonary effort is normal.      Breath sounds: Normal breath sounds.   Chest:          Comments: Both of her breasts have multiple palpable fibrocystic changes. No obvious palpable mass. No LAD  Abdominal:      General: Abdomen is flat. Bowel sounds are normal.      Palpations: Abdomen is soft.   Musculoskeletal:         General: Normal range of motion.      Cervical back: Neck supple.   Lymphadenopathy:      Upper Body:      Right upper body: No axillary adenopathy.      Left upper body: No axillary adenopathy.   Skin:     General: Skin is warm and dry.      Capillary Refill: Capillary refill takes less than 2 seconds.   Neurological:      General: No focal deficit present.      Mental Status: She is alert and oriented to person, place, and time. Mental status is at baseline.   Psychiatric:         Mood and Affect: Mood normal.         Behavior: Behavior normal.         Thought Content: Thought content normal.         Judgment: Judgment normal.           Lab Visit on 09/14/2022   Component Date Value Ref Range Status    Sodium Level 09/14/2022 138  136 - 145 mmol/L Final    Potassium Level 09/14/2022 3.8  3.5 - 5.1 mmol/L Final    Chloride 09/14/2022 104  98 - 107 mmol/L Final    Carbon Dioxide 09/14/2022 27  22 - 29 mmol/L Final    Glucose  Level 09/14/2022 115 (H)  74 - 100 mg/dL Final    Blood Urea Nitrogen 09/14/2022 11.9  7.0 - 18.7 mg/dL Final    Creatinine 09/14/2022 0.75  0.55 - 1.02 mg/dL Final    Calcium Level Total 09/14/2022 9.5  8.4 - 10.2 mg/dL Final    Protein Total 09/14/2022 7.6  6.4 - 8.3 gm/dL Final    Albumin Level 09/14/2022 4.3  3.5 - 5.0 gm/dL Final    Globulin 09/14/2022 3.3  2.4 - 3.5 gm/dL Final    Albumin/Globulin Ratio 09/14/2022 1.3  1.1 - 2.0 ratio Final    Bilirubin Total 09/14/2022 0.4  <=1.5 mg/dL Final    Alkaline Phosphatase 09/14/2022 44  40 - 150 unit/L Final    Alanine Aminotransferase 09/14/2022 15  0 - 55 unit/L Final    Aspartate Aminotransferase 09/14/2022 17  5 - 34 unit/L Final    eGFR 09/14/2022 >60  mls/min/1.73/m2 Final    Beta Human Chorionic Gonadotropin * 09/14/2022 <2.42  <=5.00 mIU/mL Final    Quantitative Beta hCG reference range:    Approximate gestational age     Approximate hCG range (mIU/mL)  0.2-1 week                                  5-50  1-2 weeks                                      2-3 weeks                                    100-5000  3-4 weeks                                    500-10,000  4-5 weeks                                    1,000-50,000  5-6 weeks                                    10,000-100,000  6-8 weeks                                    15,000-200,000  8-12 weeks                                  10,000-100,000    Diagnosis and monitoring of trophoblastic neoplasia should not be based on   serum chrionic gonadotropin levels alone.  Less than 5 mIU/mL = negative     Assessment and Plan     Problem List Items Addressed This Visit          Renal/    Abnormal mammogram - Primary    Current Assessment & Plan     Keep appt for biopsy on 9/26/22            Oncology    Ductal carcinoma in situ (DCIS) of breast    Current Assessment & Plan     Keep appt for biopsy on 9/26/22  Hold tamoxifen for now  Continue follow-up primary care on blood pressure  Avoid pregnancy  Avoidance  of hormones            Follow up in about 3 weeks (around 10/5/2022) for Visit with Dr. Bradford.

## 2022-09-14 ENCOUNTER — OFFICE VISIT (OUTPATIENT)
Dept: HEMATOLOGY/ONCOLOGY | Facility: CLINIC | Age: 47
End: 2022-09-14
Payer: COMMERCIAL

## 2022-09-14 VITALS
DIASTOLIC BLOOD PRESSURE: 73 MMHG | SYSTOLIC BLOOD PRESSURE: 148 MMHG | BODY MASS INDEX: 28.72 KG/M2 | TEMPERATURE: 98 F | OXYGEN SATURATION: 99 % | HEART RATE: 82 BPM | WEIGHT: 156.06 LBS | HEIGHT: 62 IN

## 2022-09-14 DIAGNOSIS — C50.911 MALIGNANT NEOPLASM OF RIGHT FEMALE BREAST, UNSPECIFIED ESTROGEN RECEPTOR STATUS, UNSPECIFIED SITE OF BREAST: Primary | ICD-10-CM

## 2022-09-14 DIAGNOSIS — R92.8 ABNORMAL MAMMOGRAM: Primary | ICD-10-CM

## 2022-09-14 DIAGNOSIS — D05.11 DUCTAL CARCINOMA IN SITU (DCIS) OF RIGHT BREAST: ICD-10-CM

## 2022-09-14 PROCEDURE — 99213 PR OFFICE/OUTPT VISIT, EST, LEVL III, 20-29 MIN: ICD-10-PCS | Mod: S$GLB,,, | Performed by: NURSE PRACTITIONER

## 2022-09-14 PROCEDURE — 3077F SYST BP >= 140 MM HG: CPT | Mod: CPTII,S$GLB,, | Performed by: NURSE PRACTITIONER

## 2022-09-14 PROCEDURE — 1160F RVW MEDS BY RX/DR IN RCRD: CPT | Mod: CPTII,S$GLB,, | Performed by: NURSE PRACTITIONER

## 2022-09-14 PROCEDURE — 3008F BODY MASS INDEX DOCD: CPT | Mod: CPTII,S$GLB,, | Performed by: NURSE PRACTITIONER

## 2022-09-14 PROCEDURE — 4010F ACE/ARB THERAPY RXD/TAKEN: CPT | Mod: CPTII,S$GLB,, | Performed by: NURSE PRACTITIONER

## 2022-09-14 PROCEDURE — 99213 OFFICE O/P EST LOW 20 MIN: CPT | Mod: S$GLB,,, | Performed by: NURSE PRACTITIONER

## 2022-09-14 PROCEDURE — 1159F PR MEDICATION LIST DOCUMENTED IN MEDICAL RECORD: ICD-10-PCS | Mod: CPTII,S$GLB,, | Performed by: NURSE PRACTITIONER

## 2022-09-14 PROCEDURE — 3077F PR MOST RECENT SYSTOLIC BLOOD PRESSURE >= 140 MM HG: ICD-10-PCS | Mod: CPTII,S$GLB,, | Performed by: NURSE PRACTITIONER

## 2022-09-14 PROCEDURE — 1160F PR REVIEW ALL MEDS BY PRESCRIBER/CLIN PHARMACIST DOCUMENTED: ICD-10-PCS | Mod: CPTII,S$GLB,, | Performed by: NURSE PRACTITIONER

## 2022-09-14 PROCEDURE — 3078F PR MOST RECENT DIASTOLIC BLOOD PRESSURE < 80 MM HG: ICD-10-PCS | Mod: CPTII,S$GLB,, | Performed by: NURSE PRACTITIONER

## 2022-09-14 PROCEDURE — 3008F PR BODY MASS INDEX (BMI) DOCUMENTED: ICD-10-PCS | Mod: CPTII,S$GLB,, | Performed by: NURSE PRACTITIONER

## 2022-09-14 PROCEDURE — 1159F MED LIST DOCD IN RCRD: CPT | Mod: CPTII,S$GLB,, | Performed by: NURSE PRACTITIONER

## 2022-09-14 PROCEDURE — 3078F DIAST BP <80 MM HG: CPT | Mod: CPTII,S$GLB,, | Performed by: NURSE PRACTITIONER

## 2022-09-14 PROCEDURE — 4010F PR ACE/ARB THEARPY RXD/TAKEN: ICD-10-PCS | Mod: CPTII,S$GLB,, | Performed by: NURSE PRACTITIONER

## 2022-09-14 PROCEDURE — 99999 PR PBB SHADOW E&M-EST. PATIENT-LVL III: ICD-10-PCS | Mod: PBBFAC,,, | Performed by: NURSE PRACTITIONER

## 2022-09-14 PROCEDURE — 99999 PR PBB SHADOW E&M-EST. PATIENT-LVL III: CPT | Mod: PBBFAC,,, | Performed by: NURSE PRACTITIONER

## 2022-09-23 ENCOUNTER — HOSPITAL ENCOUNTER (OUTPATIENT)
Dept: RADIOLOGY | Facility: HOSPITAL | Age: 47
Discharge: HOME | End: 2022-09-23
Attending: NURSE PRACTITIONER
Payer: COMMERCIAL

## 2022-09-23 DIAGNOSIS — R92.8 ABNORMAL MAMMOGRAM: ICD-10-CM

## 2022-09-23 DIAGNOSIS — R92.8 ABNORMAL MAMMOGRAM: Primary | ICD-10-CM

## 2022-09-23 DIAGNOSIS — D05.10 DUCTAL CARCINOMA IN SITU (DCIS) OF BREAST: ICD-10-CM

## 2022-09-23 PROCEDURE — 19082 BX BREAST ADD LESION STRTCTC: CPT

## 2022-09-23 PROCEDURE — 19081 BX BREAST 1ST LESION STRTCTC: CPT | Mod: RT,,, | Performed by: RADIOLOGY

## 2022-09-23 PROCEDURE — 19082 MAMMO BREAST BIOPSY WITH IMAGING EA ADD SITE: ICD-10-PCS | Mod: RT,,, | Performed by: RADIOLOGY

## 2022-09-23 PROCEDURE — 27000549 MAMMO BREAST BIOPSY WITH IMAGING EA ADD SITE

## 2022-09-23 PROCEDURE — 19081 MAMMO BREAST STEREOTACTIC BIOPSY 1ST SITE COMPLETE: ICD-10-PCS | Mod: RT,,, | Performed by: RADIOLOGY

## 2022-09-23 PROCEDURE — 19082 BX BREAST ADD LESION STRTCTC: CPT | Mod: RT,,, | Performed by: RADIOLOGY

## 2022-09-23 PROCEDURE — 27201044 MAMMO BREAST STEREOTACTIC BIOPSY 1ST SITE COMPLETE

## 2022-09-23 NOTE — PROGRESS NOTES
Specimen #1 right breast upper outer quadrant posterior depth heterogenous calcifications  Calcs #6,7 DCIS VS. FA   H/O DCIS S/P  RADIATION

## 2022-09-23 NOTE — PROGRESS NOTES
Site#2 right breast upper outer quadrant mid-depth hetero calcifications  Calcs #3,4  DCIS VS. FA (H/O DCIS S/P RADIATION)

## 2022-09-27 DIAGNOSIS — D05.11 DUCTAL CARCINOMA IN SITU (DCIS) OF RIGHT BREAST: Primary | ICD-10-CM

## 2022-09-30 NOTE — PROGRESS NOTES
Heme/Onc Progress Note    PATIENT: Glenis Roberson  MRN: [id number]  DATE: 10/4/2022  Chief Complaint: Follow-up (3 week f/u)      Referring Provider: Dr. Zarate  Current Treatment:  Tamoxifen 8/03/22    Oncology History Overview Note   Patient underwent routine screening mammogram which showed calcifications and focal asymmetries in the right breast. Diagnostic imaging revealed calcifications in the upper outer quadrant and a 9 o'clock periareolar mass. Both areas were biopsied, the 9 o'clock subareolar mass showed no evidence of malignancy, results benign and concordant however the upper outer quadrant 9:00 posterior calcifications showed DCIS, grade 2 with focal comedonecrosis. The patient reports significant family history of breast cancer in her mother, her maternal grandmother, and several maternal great aunts. Her sister also has a history of ADH.     Ductal carcinoma in situ (DCIS) of breast   3/31/2022 Cancer Staged    Staging form: Breast, AJCC 8th Edition  - Clinical stage from 3/31/2022: Stage 0 (cTis (DCIS), cN0, cM0, G2, ER+, NE+, HER2: Not Assessed)       5/11/2022 Surgery    1. Right breast Ultrasound-guided wire localization and lumpectomy  2. Re-excision of the medial margin  3. Re-excision of the superior margin  4. Re-excision of the deep margin    DUCTAL CARCINOMA IN SITU, LOW GRADE SOLID TYPE (0.4 CM).  DCIS IS 0.7 CM FROM THE CLOSEST-LATERAL MARGIN.  PRIOR BIOPSY SITE.   MULTIPLE INTRADUCTAL PAPILLOMAS WITH FLORID DUCT EPITHELIAL HYPERPLASIA.     [2] RE-EXCISION OF SUPERIOR MARGIN:   ATYPICAL LOBULAR HYPERPLASIA.   FLORID DUCT EPITHELIAL HYPERPLASIA, USUAL TYPE.     [3] RE-EXCISION OF MEDIAL MARGIN:   FLORID DUCT EPITHELIAL HYPERPLASIA, USUAL TYPE.     [4] RE-EXCISION OF DEEP MARGIN:   FLORID DUCT EPITHELIAL HYPERPLASIA, USUAL TYPE.        5/24/2022 Genetic Testing    Genetic testing negative  This comprehensive [facility] Cancer[facility]ight analysis indicated that there was no  deleterious mutation and no variants of uncertain significance found in 36 genes with known associated to increased cancer risk: APC, RICKY AXIN2, BARD1, BRCA1, BRCA2, BMPR1A, BRIP1, CDH1, CDK4, CDKN2A, CHEK2, DICER1, HOXB13, EPCAM, GREM1, MLH1, MSH2, MSH6, MUTYH, NBN, NF12, PALB2, PMS2, POLD1, POLE, PTEN, RAD50, RECQL, RAD51C, RAD51D, SM4D4, SMARCA4, STK11, TP53      - 8/2022 Radiation Therapy         8/3/2022 -  Hormone Therapy    Tamoxifen     9/23/2022 Surgery    2.  RIGHT BREAST UPPER OUTER QUADRANT MID DEPTH HETERO CALCIFICATIONS:   FOCAL DUCTAL CARCINOMA IN SITU, SOLID AND COMEDO TYPES, NUCLEAR GRADE 3, 2.5 MM GREATEST EXTENT.         10/04/2022  Patient presents today for follow-up after starting tamoxifen.   she still continues to have monthly cycles.  Her recent right diagnostic mammogram showed regional coarse heterogeneous calcifications that spanned 8 x 7 x 8 cm inner suspicious.  The calcifications extend to within 1.5 cm from the base of the nipple.  BI-RADS category 4 follow-up ultrasound of this area again showed T-scores heterogeneous calcifications with the span as mentioned above BI-RADS 4 suspicious.   stereotactic tomosynthesis guided core needle biopsy of that right breast scheduled for September 26th.:    RIGHT BREAST UPPER OUTER QUADRANT MID DEPTH HETERO CALCIFICATIONS:   FOCAL DUCTAL CARCINOMA IN SITU, SOLID AND COMEDO TYPES, NUCLEAR GRADE 3, 2.5 MM       Past Medical History:   Diagnosis Date    Cancer     Breast    Ductal carcinoma in situ (DCIS) of breast 5/24/2022    Heart murmur     History of multiple miscarriages     Hypertension     Mitral valve prolapse     Mosaic Briceno syndrome     pt has seen Dr. Coleman.. last visit about 7 yrs ago.    Nasal fracture     PONV (postoperative nausea and vomiting)         Current Outpatient Medications:     ALPRAZolam (XANAX) 0.25 MG tablet, Take 0.25 mg by mouth 3 (three) times daily., Disp: , Rfl:     ascorbic acid/vitamin E/biotin (HAIR, SKIN,  NAILS WITH BIOTIN ORAL), Take 1 Dose by mouth nightly., Disp: , Rfl:     cyanocobalamin/cobamamide (B12 SL), Place 1,000 mcg under the tongue nightly., Disp: , Rfl:     lisinopriL (PRINIVIL,ZESTRIL) 20 MG tablet, take one tablet by mouth once daily, Disp: 90 tablet, Rfl: 3    multivit-minerals/folic acid (MULTIVITAMIN GUMMIES ORAL), Take 1 Dose by mouth nightly., Disp: , Rfl:      Review of Systems:   Answers submitted by the patient for this visit:  Review of Systems Questionnaire (Submitted on 9/27/2022)  appetite change : No  unexpected weight change: No  mouth sores: No  visual disturbance: No  cough: No  shortness of breath: No  chest pain: No  abdominal pain: No  diarrhea: No  frequency: No  back pain: No  rash: No  headaches: No  adenopathy: No  nervous/ anxious: No            Objective:     Vitals:    10/04/22 1311   BP: (!) 164/95   Pulse: 78   Temp: 98.9 °F (37.2 °C)           Physical Exam  Constitutional:       General: She is not in acute distress.     Appearance: Normal appearance. She is normal weight. She is not ill-appearing or toxic-appearing.   HENT:      Head: Normocephalic and atraumatic.      Mouth/Throat:      Pharynx: Oropharynx is clear.   Eyes:      Extraocular Movements: Extraocular movements intact.      Conjunctiva/sclera: Conjunctivae normal.      Pupils: Pupils are equal, round, and reactive to light.   Cardiovascular:      Rate and Rhythm: Normal rate and regular rhythm.      Pulses: Normal pulses.      Heart sounds: Normal heart sounds.   Pulmonary:      Effort: Pulmonary effort is normal.      Breath sounds: Normal breath sounds.   Abdominal:      General: Abdomen is flat. Bowel sounds are normal.      Palpations: Abdomen is soft.   Musculoskeletal:         General: Normal range of motion.      Cervical back: Neck supple.   Lymphadenopathy:      Upper Body:      Right upper body: No axillary adenopathy.      Left upper body: No axillary adenopathy.   Skin:     General: Skin is warm  and dry.      Capillary Refill: Capillary refill takes less than 2 seconds.   Neurological:      General: No focal deficit present.      Mental Status: She is alert and oriented to person, place, and time. Mental status is at baseline.   Psychiatric:         Mood and Affect: Mood normal.         Behavior: Behavior normal.         Thought Content: Thought content normal.         Judgment: Judgment normal.           No visits with results within 1 Week(s) from this visit.   Latest known visit with results is:   Hospital Outpatient Visit on 09/23/2022   Component Date Value Ref Range Status    Case Report 09/23/2022    Final                    Value:Surgical Pathology                                Case: [id number]                                 Authorizing Provider:  Vincenzo Villalba MD         Collected:           09/23/2022 09:28 AM          Ordering Location:     [facility]  Received:            09/23/2022 02:02 PM                                 [facility]                                                                                     Mammography                                                                  Pathologist:           Cam Cosme MD                                                              Specimens:   1) - Breast, Right, RIGHT BREAST UPPER OUTER QUADRANT POSTERIOR DEPTH HETEROGENEOUS                 CALCIFICATIONS                                                                                      2) - Breast, Right, RIGHT BREAST UPPER OUTER QUADRANT MID DEPTH HETERO CALCIFICATIONS      Final Diagnosis 09/23/2022    Final                    Value:This result contains rich text formatting which cannot be displayed here.    Comments 09/23/2022    Final                    Value:This result contains rich text formatting which cannot be displayed here.    Clinical Information 09/23/2022    Final                    Value:This result contains rich text formatting which cannot  be displayed here.    Microscopic Description 09/23/2022    Final                    Value:This result contains rich text formatting which cannot be displayed here.    Gross Description 09/23/2022    Final                    Value:This result contains rich text formatting which cannot be displayed here.    Report Footnotes 09/23/2022    Final                    Value:This result contains rich text formatting which cannot be displayed here.     Assessment and Plan     Problem List Items Addressed This Visit          Oncology    Ductal carcinoma in situ (DCIS) of breast - Primary    Current Assessment & Plan     I discussed with this patient the new finding of DCIS in this ipsilateral breast despite lumpectomy and radiation.    She has not had enough tamoxifen to make a difference however there was still persistent disease.    MRI was discussed and potential for mastectomy.    Keep appt for surgery--with persistent DCIS  Hold tamoxifen for now await surgical findings  Continue follow-up primary care on blood pressure  Avoid pregnancy  Avoidance of hormones  Breast as soon as possible with copy to Dr. Zarate  RTC  approximately 6-8 weeks         Relevant Orders    MRI Breast w/wo Contrast, w/CAD, Bilateral       Follow up in about 6 weeks (around 11/15/2022) for EOV.        Matthew Bradford MD

## 2022-09-30 NOTE — ASSESSMENT & PLAN NOTE
· I discussed with this patient the new finding of DCIS in this ipsilateral breast despite lumpectomy and radiation.    · She has not had enough tamoxifen to make a difference however there was still persistent disease.    · MRI was discussed and potential for mastectomy.  ·   · Keep appt for surgery--with persistent DCIS  · Hold tamoxifen for now await surgical findings  · Continue follow-up primary care on blood pressure  · Avoid pregnancy  · Avoidance of hormones  · Breast as soon as possible with copy to Dr. Zarate  · RTC  approximately 6-8 weeks

## 2022-10-04 ENCOUNTER — OFFICE VISIT (OUTPATIENT)
Dept: HEMATOLOGY/ONCOLOGY | Facility: CLINIC | Age: 47
End: 2022-10-04
Payer: COMMERCIAL

## 2022-10-04 VITALS
DIASTOLIC BLOOD PRESSURE: 95 MMHG | TEMPERATURE: 99 F | WEIGHT: 157.44 LBS | OXYGEN SATURATION: 98 % | HEART RATE: 78 BPM | BODY MASS INDEX: 28.97 KG/M2 | SYSTOLIC BLOOD PRESSURE: 164 MMHG | HEIGHT: 62 IN

## 2022-10-04 DIAGNOSIS — D05.11 DUCTAL CARCINOMA IN SITU (DCIS) OF RIGHT BREAST: Primary | ICD-10-CM

## 2022-10-04 PROCEDURE — 4010F PR ACE/ARB THEARPY RXD/TAKEN: ICD-10-PCS | Mod: CPTII,S$GLB,, | Performed by: INTERNAL MEDICINE

## 2022-10-04 PROCEDURE — 99214 OFFICE O/P EST MOD 30 MIN: CPT | Mod: S$GLB,,, | Performed by: INTERNAL MEDICINE

## 2022-10-04 PROCEDURE — 1159F PR MEDICATION LIST DOCUMENTED IN MEDICAL RECORD: ICD-10-PCS | Mod: CPTII,S$GLB,, | Performed by: INTERNAL MEDICINE

## 2022-10-04 PROCEDURE — 4010F ACE/ARB THERAPY RXD/TAKEN: CPT | Mod: CPTII,S$GLB,, | Performed by: INTERNAL MEDICINE

## 2022-10-04 PROCEDURE — 1159F MED LIST DOCD IN RCRD: CPT | Mod: CPTII,S$GLB,, | Performed by: INTERNAL MEDICINE

## 2022-10-04 PROCEDURE — 3008F PR BODY MASS INDEX (BMI) DOCUMENTED: ICD-10-PCS | Mod: CPTII,S$GLB,, | Performed by: INTERNAL MEDICINE

## 2022-10-04 PROCEDURE — 1160F RVW MEDS BY RX/DR IN RCRD: CPT | Mod: CPTII,S$GLB,, | Performed by: INTERNAL MEDICINE

## 2022-10-04 PROCEDURE — 99999 PR PBB SHADOW E&M-EST. PATIENT-LVL IV: ICD-10-PCS | Mod: PBBFAC,,, | Performed by: INTERNAL MEDICINE

## 2022-10-04 PROCEDURE — 99214 PR OFFICE/OUTPT VISIT, EST, LEVL IV, 30-39 MIN: ICD-10-PCS | Mod: S$GLB,,, | Performed by: INTERNAL MEDICINE

## 2022-10-04 PROCEDURE — 3077F SYST BP >= 140 MM HG: CPT | Mod: CPTII,S$GLB,, | Performed by: INTERNAL MEDICINE

## 2022-10-04 PROCEDURE — 3077F PR MOST RECENT SYSTOLIC BLOOD PRESSURE >= 140 MM HG: ICD-10-PCS | Mod: CPTII,S$GLB,, | Performed by: INTERNAL MEDICINE

## 2022-10-04 PROCEDURE — 1160F PR REVIEW ALL MEDS BY PRESCRIBER/CLIN PHARMACIST DOCUMENTED: ICD-10-PCS | Mod: CPTII,S$GLB,, | Performed by: INTERNAL MEDICINE

## 2022-10-04 PROCEDURE — 3008F BODY MASS INDEX DOCD: CPT | Mod: CPTII,S$GLB,, | Performed by: INTERNAL MEDICINE

## 2022-10-04 PROCEDURE — 99999 PR PBB SHADOW E&M-EST. PATIENT-LVL IV: CPT | Mod: PBBFAC,,, | Performed by: INTERNAL MEDICINE

## 2022-10-04 PROCEDURE — 3080F DIAST BP >= 90 MM HG: CPT | Mod: CPTII,S$GLB,, | Performed by: INTERNAL MEDICINE

## 2022-10-04 PROCEDURE — 3080F PR MOST RECENT DIASTOLIC BLOOD PRESSURE >= 90 MM HG: ICD-10-PCS | Mod: CPTII,S$GLB,, | Performed by: INTERNAL MEDICINE

## 2022-10-07 LAB
DHEA SERPL-MCNC: NORMAL
ESTRIOL SERPL-MCNC: NORMAL NG/ML
ESTROGEN SERPL-MCNC: NORMAL PG/ML
INSULIN SERPL-ACNC: NORMAL U[IU]/ML
LAB AP CLINICAL INFORMATION: NORMAL
LAB AP GROSS DESCRIPTION: NORMAL
LAB AP REPORT FOOTNOTES: NORMAL
T3RU NFR SERPL: NORMAL %

## 2022-10-12 ENCOUNTER — APPOINTMENT (OUTPATIENT)
Dept: RADIOLOGY | Facility: HOSPITAL | Age: 47
End: 2022-10-12
Attending: INTERNAL MEDICINE
Payer: COMMERCIAL

## 2022-10-12 DIAGNOSIS — D05.11 DUCTAL CARCINOMA IN SITU (DCIS) OF RIGHT BREAST: ICD-10-CM

## 2022-10-12 PROCEDURE — 25500020 PHARM REV CODE 255: Performed by: INTERNAL MEDICINE

## 2022-10-12 PROCEDURE — 77049 MRI BREAST W/WO CONTRAST, W/CAD, BILATERAL: ICD-10-PCS | Mod: 26,,, | Performed by: RADIOLOGY

## 2022-10-12 PROCEDURE — 77049 MRI BREAST C-+ W/CAD BI: CPT | Mod: 26,,, | Performed by: RADIOLOGY

## 2022-10-12 PROCEDURE — A9577 INJ MULTIHANCE: HCPCS | Performed by: INTERNAL MEDICINE

## 2022-10-12 PROCEDURE — 77049 MRI BREAST C-+ W/CAD BI: CPT | Mod: TC

## 2022-10-12 RX ADMIN — GADOBENATE DIMEGLUMINE 15 ML: 529 INJECTION, SOLUTION INTRAVENOUS at 02:10

## 2022-10-25 ENCOUNTER — DOCUMENTATION ONLY (OUTPATIENT)
Dept: SURGICAL ONCOLOGY | Facility: CLINIC | Age: 47
End: 2022-10-25

## 2022-10-25 ENCOUNTER — OFFICE VISIT (OUTPATIENT)
Dept: SURGICAL ONCOLOGY | Facility: CLINIC | Age: 47
End: 2022-10-25
Payer: COMMERCIAL

## 2022-10-25 VITALS
BODY MASS INDEX: 28.78 KG/M2 | HEIGHT: 62 IN | DIASTOLIC BLOOD PRESSURE: 99 MMHG | HEART RATE: 86 BPM | SYSTOLIC BLOOD PRESSURE: 152 MMHG | WEIGHT: 156.38 LBS

## 2022-10-25 DIAGNOSIS — D05.11 DUCTAL CARCINOMA IN SITU (DCIS) OF RIGHT BREAST: ICD-10-CM

## 2022-10-25 DIAGNOSIS — Z85.3 HX OF BREAST CANCER: ICD-10-CM

## 2022-10-25 DIAGNOSIS — D05.11 DUCTAL CARCINOMA IN SITU (DCIS) OF RIGHT BREAST: Primary | ICD-10-CM

## 2022-10-25 PROCEDURE — 99999 PR PBB SHADOW E&M-EST. PATIENT-LVL III: CPT | Mod: PBBFAC,,, | Performed by: SURGERY

## 2022-10-25 PROCEDURE — 99999 PR PBB SHADOW E&M-EST. PATIENT-LVL III: ICD-10-PCS | Mod: PBBFAC,,, | Performed by: SURGERY

## 2022-10-25 PROCEDURE — 99205 PR OFFICE/OUTPT VISIT, NEW, LEVL V, 60-74 MIN: ICD-10-PCS | Mod: S$GLB,,, | Performed by: SURGERY

## 2022-10-25 PROCEDURE — 1159F MED LIST DOCD IN RCRD: CPT | Mod: CPTII,S$GLB,, | Performed by: SURGERY

## 2022-10-25 PROCEDURE — 99205 OFFICE O/P NEW HI 60 MIN: CPT | Mod: S$GLB,,, | Performed by: SURGERY

## 2022-10-25 PROCEDURE — 3080F DIAST BP >= 90 MM HG: CPT | Mod: CPTII,S$GLB,, | Performed by: SURGERY

## 2022-10-25 PROCEDURE — 3077F SYST BP >= 140 MM HG: CPT | Mod: CPTII,S$GLB,, | Performed by: SURGERY

## 2022-10-25 PROCEDURE — 3077F PR MOST RECENT SYSTOLIC BLOOD PRESSURE >= 140 MM HG: ICD-10-PCS | Mod: CPTII,S$GLB,, | Performed by: SURGERY

## 2022-10-25 PROCEDURE — 4010F PR ACE/ARB THEARPY RXD/TAKEN: ICD-10-PCS | Mod: CPTII,S$GLB,, | Performed by: SURGERY

## 2022-10-25 PROCEDURE — 3080F PR MOST RECENT DIASTOLIC BLOOD PRESSURE >= 90 MM HG: ICD-10-PCS | Mod: CPTII,S$GLB,, | Performed by: SURGERY

## 2022-10-25 PROCEDURE — 1159F PR MEDICATION LIST DOCUMENTED IN MEDICAL RECORD: ICD-10-PCS | Mod: CPTII,S$GLB,, | Performed by: SURGERY

## 2022-10-25 PROCEDURE — 4010F ACE/ARB THERAPY RXD/TAKEN: CPT | Mod: CPTII,S$GLB,, | Performed by: SURGERY

## 2022-10-25 NOTE — PROGRESS NOTES
Chief complaint:  Recurrent DCIS right breast     HPI:  47-year-old female with a history of DCIS diagnosed in May 2022 treated with lumpectomy, negative margins of excision and adjuvant radiation therapy.  She had begun tamoxifen therapy.  Recent surveillance mammography demonstrated a large area of new suspicious calcifications in the upper-outer quadrant of the right breast spanning 8 cm, extending to the nipple.  MRI was subsequently performed as well and demonstrated asymmetric global enhancement throughout all 4 quadrants of the right breast including the nipple-areolar complex.  No evidence of suspicious findings in the left breast.  She had previously undergone genetic testing which was negative.    Greater than 60 minutes was required for complete chart review, imaging review including interpretation of imaging, coordination with referring/other physicians, patient counseling regarding diagnosis/treatment plan, answering questions, medical decision making, and documentation.          Past Medical and Surgical History  Allergies :   Sulfa (sulfonamide antibiotics)    [facility]  Medical :   She has a past medical history of Cancer, Ductal carcinoma in situ (DCIS) of breast (2022), Heart murmur, History of multiple miscarriages, Hypertension, Mitral valve prolapse, Mosaic Briceno syndrome, Nasal fracture, and PONV (postoperative nausea and vomiting).    Surgical :   She has a past surgical history that includes Excision of rectal mass; Dilation and curettage of uterus; Colonoscopy; Mole removal (Left); ; Fracture surgery; Breast biopsy (Right, 2022); and Insertion of localization wire (Right, 2022).     Family History  Her family history includes Alzheimer's disease in her maternal grandmother and paternal grandfather; Breast cancer in her maternal grandmother and mother; Dementia in her paternal grandmother; Diabetes in her paternal grandmother; Heart attack in her maternal  grandfather; Heart disease in her maternal grandfather; Hypertension in her father and mother; Leukemia in her father; No Known Problems in her brother and sister.    Social History  She reports that she has never smoked. She has never used smokeless tobacco. She reports current alcohol use. She reports that she does not use drugs.     Review of Systems   Constitutional:  Negative for appetite change, chills, diaphoresis and fever.   HENT:  Negative for congestion, drooling, ear discharge, ear pain and hearing loss.    Eyes:  Negative for discharge.   Respiratory:  Negative for apnea, cough, choking, chest tightness, shortness of breath and stridor.    Cardiovascular:  Negative for chest pain, palpitations and leg swelling.   Endocrine: Negative for cold intolerance and heat intolerance.   Genitourinary:  Negative for difficulty urinating, dyspareunia, dysuria and hematuria.   Musculoskeletal:  Negative for arthralgias, gait problem and joint swelling.   Skin:  Negative for color change and rash.   Neurological:  Negative for dizziness, tremors, seizures, syncope, facial asymmetry, speech difficulty, light-headedness, numbness and headaches.   Psychiatric/Behavioral:  Negative for agitation and confusion.       Objective   Physical Exam  Constitutional:       General: She is not in acute distress.     Appearance: Normal appearance. She is normal weight. She is not toxic-appearing.   HENT:      Head: Normocephalic and atraumatic.      Right Ear: External ear normal.      Left Ear: External ear normal.      Nose: Nose normal.      Mouth/Throat:      Mouth: Mucous membranes are moist.      Pharynx: Oropharynx is clear.   Eyes:      General: No scleral icterus.     Conjunctiva/sclera: Conjunctivae normal.      Pupils: Pupils are equal, round, and reactive to light.   Cardiovascular:      Rate and Rhythm: Normal rate and regular rhythm.      Pulses: Normal pulses.      Heart sounds: Normal heart sounds. No murmur  "heard.    No gallop.   Pulmonary:      Effort: Pulmonary effort is normal.      Breath sounds: Normal breath sounds. No stridor. No wheezing or rhonchi.   Chest:      Chest wall: No tenderness.   Breasts:     Right: No swelling, bleeding, inverted nipple, mass, nipple discharge, skin change or tenderness.      Left: No swelling, bleeding, inverted nipple, mass, nipple discharge, skin change or tenderness.   Abdominal:      General: Abdomen is flat. There is no distension.      Palpations: Abdomen is soft. There is no mass.      Tenderness: There is no abdominal tenderness. There is no guarding or rebound.      Hernia: No hernia is present.   Musculoskeletal:         General: No swelling, tenderness, deformity or signs of injury. Normal range of motion.      Cervical back: Normal range of motion and neck supple.      Right lower leg: No edema.      Left lower leg: No edema.   Lymphadenopathy:      Upper Body:      Right upper body: No supraclavicular or axillary adenopathy.      Left upper body: No supraclavicular or axillary adenopathy.   Skin:     Capillary Refill: Capillary refill takes less than 2 seconds.      Coloration: Skin is not jaundiced or pale.      Findings: No erythema.   Neurological:      General: No focal deficit present.      Mental Status: She is alert and oriented to person, place, and time.      Cranial Nerves: No cranial nerve deficit.      Motor: No weakness.      Gait: Gait normal.   Psychiatric:         Mood and Affect: Mood normal.         Behavior: Behavior normal.     VITAL SIGNS: 24 HR MIN & MAX LAST    @FLOWSTAT(6:24::1)@           @FLOWSTAT(5:24::1)@  (!) 152/99     @FLOWSTAT(8:24::1)@  86     @FLOWSTAT(9:24::1)@       @FLOWSTAT(10:24::1)@         HT: 5' 2" (157.5 cm)  WT: 70.9 kg (156 lb 6.4 oz)  BMI: 28.6       Assessment & Plan     Recurrent DCIS right breast, 8 cm area extending to the nipple    Diagnosis, staging, prognosis and treatment guidelines for noninvasive cancer were " discussed with the patient in detail, all questions were addressed.  She is not a candidate for further breast conservation.  Mastectomy is recommended.  She is interested in reconstruction.  She is considering prophylactic left mastectomy as well    Referred to Plastic surgery to discuss reconstructive options, return to clinic thereafter for surgical treatment plan    Scot Zarate MD  Surgical Oncology  Complex General, Gastrointestinal and Hepatobiliary Surgery

## 2022-10-25 NOTE — PROGRESS NOTES
Referral faxed to Dr. Isaiah Conti. They are to contact patient with appointment date and time.    P# [phone]  F# [phone]

## 2022-10-26 ENCOUNTER — DOCUMENTATION ONLY (OUTPATIENT)
Dept: SURGICAL ONCOLOGY | Facility: CLINIC | Age: 47
End: 2022-10-26
Payer: COMMERCIAL

## 2022-10-26 NOTE — PROGRESS NOTES
Patient called the office and stated that she would like to see Dr. Crespo for Plastic Surgery. Notified Dr. Isaiah Conti's office to disregard the referral sent on 10/25/22. Referral faxed to Dr. Evan Crespo. They are to contact patient with appointment date and time.    P# [phone]  F# [phone]

## 2022-11-08 DIAGNOSIS — Z40.01 PROPHYLACTIC BREAST REMOVAL: ICD-10-CM

## 2022-11-08 DIAGNOSIS — Z80.3 FAMILY HISTORY OF BREAST CANCER: ICD-10-CM

## 2022-11-08 DIAGNOSIS — D05.11 DUCTAL CARCINOMA IN SITU (DCIS) OF RIGHT BREAST: Primary | ICD-10-CM

## 2022-11-08 DIAGNOSIS — Z85.3 HX OF BREAST CANCER: ICD-10-CM

## 2022-11-14 ENCOUNTER — TELEPHONE (OUTPATIENT)
Dept: HEMATOLOGY/ONCOLOGY | Facility: CLINIC | Age: 47
End: 2022-11-14
Payer: COMMERCIAL

## 2022-11-14 NOTE — TELEPHONE ENCOUNTER
----- Message from Matthew Bradford MD sent at 11/11/2022  3:46 PM CST -----  This patient has an appointment on Tuesday.    Please find out  when her surgery date.    We really need to see her 2 -3 weeks after her surgery With her pathology-- we can move her appt

## 2022-11-20 PROBLEM — I10 HYPERTENSION: Status: ACTIVE | Noted: 2022-11-20

## 2022-11-20 PROBLEM — E66.9 OBESITY: Status: ACTIVE | Noted: 2022-11-20

## 2022-12-05 ENCOUNTER — OUTSIDE PLACE OF SERVICE (OUTPATIENT)
Dept: ADMINISTRATIVE | Facility: OTHER | Age: 47
End: 2022-12-05
Payer: COMMERCIAL

## 2022-12-05 PROCEDURE — 38900 PR INTRAOPERATIVE SENTINEL LYMPH NODE ID W DYE INJECTION: ICD-10-PCS | Mod: RT,,, | Performed by: SURGERY

## 2022-12-05 PROCEDURE — 19303 PR MASTECTOMY, SIMPLE, COMPLETE: ICD-10-PCS | Mod: 50,AS,, | Performed by: NURSE PRACTITIONER

## 2022-12-05 PROCEDURE — 19303 PR MASTECTOMY, SIMPLE, COMPLETE: ICD-10-PCS | Mod: 50,,, | Performed by: SURGERY

## 2022-12-05 PROCEDURE — 38525 BIOPSY/REMOVAL LYMPH NODES: CPT | Mod: 51,RT,, | Performed by: SURGERY

## 2022-12-05 PROCEDURE — 19303 MAST SIMPLE COMPLETE: CPT | Mod: 50,,, | Performed by: SURGERY

## 2022-12-05 PROCEDURE — 38792 RA TRACER ID OF SENTINL NODE: CPT | Mod: 59,RT,, | Performed by: SURGERY

## 2022-12-05 PROCEDURE — 19303 MAST SIMPLE COMPLETE: CPT | Mod: 50,AS,, | Performed by: NURSE PRACTITIONER

## 2022-12-05 PROCEDURE — 38900 IO MAP OF SENT LYMPH NODE: CPT | Mod: RT,,, | Performed by: SURGERY

## 2022-12-05 PROCEDURE — 38525 PR BIOPSY/REM LYMPH NODES, AXILLARY: ICD-10-PCS | Mod: 51,RT,, | Performed by: SURGERY

## 2022-12-05 PROCEDURE — 38792 PR IDENTIFY SENTINEL 2DE: ICD-10-PCS | Mod: 59,RT,, | Performed by: SURGERY

## 2022-12-14 ENCOUNTER — OFFICE VISIT (OUTPATIENT)
Dept: SURGICAL ONCOLOGY | Facility: CLINIC | Age: 47
End: 2022-12-14
Payer: COMMERCIAL

## 2022-12-14 DIAGNOSIS — C50.911 MALIGNANT NEOPLASM OF RIGHT FEMALE BREAST, UNSPECIFIED ESTROGEN RECEPTOR STATUS, UNSPECIFIED SITE OF BREAST: Primary | ICD-10-CM

## 2022-12-14 PROCEDURE — 99024 PR POST-OP FOLLOW-UP VISIT: ICD-10-PCS | Mod: S$GLB,,, | Performed by: NURSE PRACTITIONER

## 2022-12-14 PROCEDURE — 99024 POSTOP FOLLOW-UP VISIT: CPT | Mod: S$GLB,,, | Performed by: NURSE PRACTITIONER

## 2022-12-14 PROCEDURE — 4010F ACE/ARB THERAPY RXD/TAKEN: CPT | Mod: CPTII,S$GLB,, | Performed by: NURSE PRACTITIONER

## 2022-12-14 PROCEDURE — 4010F PR ACE/ARB THEARPY RXD/TAKEN: ICD-10-PCS | Mod: CPTII,S$GLB,, | Performed by: NURSE PRACTITIONER

## 2022-12-14 NOTE — PROGRESS NOTES
POST OP BILATERAL MASTECTOMY WITH RIGHT SN BIOPSY  CALLIE FLAP COMBO WITH DR DALLAS    PT APPEARS TO BE DOING WELL  NO PROBLEMS REPORTED  HAS ALREADY SEEN DR DALLAS IN OFFICE AND REPORTS NO ISSUES  ONE DRAIN REMAINS  HAS FOLLOW UP WITH HIM AGAIN NEXT WEEK    BILATERAL CHEST INCISIONS HEALING WELL  DRAIN SEROUS    PATH: STILL NOT READY, WE CALLED FOR UPDATE, NO RESULTS YET  EXPLAINED TO PT    SHE IS ESTABLISHED WITH MED ONC DR MICHELLE  SHE HAS FOLLOW UP WITH HIM NEXT WEEK    WILL CALL PT WITH PATH ONCE COMPLETE    RTC 6 MONTHS

## 2022-12-20 NOTE — PROGRESS NOTES
Heme/Onc Progress Note    PATIENT: Glenis Roberson  MRN: [id number]  DATE: 12/22/2022  Chief Complaint: Follow-up (6 week f/u)        Referring Provider: Dr. Zarate  Current Treatment:  Tamoxifen 8/03/22--stopped with surgery    Oncology History Overview Note   Patient underwent routine screening mammogram which showed calcifications and focal asymmetries in the right breast. Diagnostic imaging revealed calcifications in the upper outer quadrant and a 9 o'clock periareolar mass. Both areas were biopsied, the 9 o'clock subareolar mass showed no evidence of malignancy, results benign and concordant however the upper outer quadrant 9:00 posterior calcifications showed DCIS, grade 2 with focal comedonecrosis. The patient reports significant family history of breast cancer in her mother, her maternal grandmother, and several maternal great aunts. Her sister also has a history of ADH.     Ductal carcinoma in situ (DCIS) of breast   3/31/2022 Cancer Staged    Staging form: Breast, AJCC 8th Edition  - Clinical stage from 3/31/2022: Stage 0 (cTis (DCIS), cN0, cM0, G2, ER+, WY+, HER2: Not Assessed)       5/11/2022 Surgery    1. Right breast Ultrasound-guided wire localization and lumpectomy  2. Re-excision of the medial margin  3. Re-excision of the superior margin  4. Re-excision of the deep margin    DUCTAL CARCINOMA IN SITU, LOW GRADE SOLID TYPE (0.4 CM).  DCIS IS 0.7 CM FROM THE CLOSEST-LATERAL MARGIN.  PRIOR BIOPSY SITE.   MULTIPLE INTRADUCTAL PAPILLOMAS WITH FLORID DUCT EPITHELIAL HYPERPLASIA.     [2] RE-EXCISION OF SUPERIOR MARGIN:   ATYPICAL LOBULAR HYPERPLASIA.   FLORID DUCT EPITHELIAL HYPERPLASIA, USUAL TYPE.     [3] RE-EXCISION OF MEDIAL MARGIN:   FLORID DUCT EPITHELIAL HYPERPLASIA, USUAL TYPE.     [4] RE-EXCISION OF DEEP MARGIN:   FLORID DUCT EPITHELIAL HYPERPLASIA, USUAL TYPE.        5/24/2022 Genetic Testing    Genetic testing negative  This comprehensive [facility] Genetics CancerNext+RNAinsight analysis  indicated that there was no deleterious mutation and no variants of uncertain significance found in 36 genes with known associated to increased cancer risk: APC, RICKY AXIN2, BARD1, BRCA1, BRCA2, BMPR1A, BRIP1, CDH1, CDK4, CDKN2A, CHEK2, DICER1, HOXB13, EPCAM, GREM1, MLH1, MSH2, MSH6, MUTYH, NBN, NF12, PALB2, PMS2, POLD1, POLE, PTEN, RAD50, RECQL, RAD51C, RAD51D, SM4D4, SMARCA4, STK11, TP53      - 8/2022 Radiation Therapy         8/3/2022 - 10/4/2022 Hormone Therapy    Tamoxifen     9/23/2022 Surgery    2.  RIGHT BREAST UPPER OUTER QUADRANT MID DEPTH HETERO CALCIFICATIONS:   FOCAL DUCTAL CARCINOMA IN SITU, SOLID AND COMEDO TYPES, NUCLEAR GRADE 3, 2.5 MM GREATEST EXTENT.     10/14/2022 Imaging Significant Findings    MRI of breasts  1. Asymmetric, global enhancement throughout all 4 quadrants of the right breast, including the right nipple-areolar complex.  While this enhancement is nonspecific and may be related to postradiation change, biopsy-proven ductal carcinoma in-situ is present in the upper-outer quadrant of the right breast, middle depth.  Mammographically, the suspicious calcifications span 80 mm anterior to posterior and to within 15 mm posterior to the base of the nipple.     2. No suspicious enhancement is identified in the left breast to suggest malignancy.     RECOMMENDATIONS:  Appropriate action for the biopsy-proven malignancy in the right breast is recommended.  Surgical consultation with surgical oncologist Dr. Scot Zarate is ongoing.     12/5/2022 Surgery    Bilateral mastectomies with right sentinel lymph node biopsy, CALLIE flap combo with Dr. Crespo    Pathology:  Left skin sparing mastectomy, chronic mastitis,  Right skin sparing mastectomy, 2 previous biopsy cavity is negative for residual DCIS, severe fibrocystic ulcerations with mastitis fibroadenoma  Right sentinel nodes negative for metastatic disease         12/22/2022  Patient presents today for follow-up after starting tamoxifen.   she  still continues to have monthly cycles.  Her recent right diagnostic mammogram showed regional coarse heterogeneous calcifications that spanned 8 x 7 x 8 cm inner suspicious.  The calcifications extend to within 1.5 cm from the base of the nipple.  BI-RADS category 4 follow-up ultrasound of this area again showed T-scores heterogeneous calcifications with the span as mentioned above BI-RADS 4 suspicious.   stereotactic tomosynthesis guided core needle biopsy of that right breast scheduled for September 26th.:    RIGHT BREAST UPPER OUTER QUADRANT MID DEPTH HETERO CALCIFICATIONS:   FOCAL DUCTAL CARCINOMA IN SITU, SOLID AND COMEDO TYPES, NUCLEAR GRADE 3, 2.5 MM       Past Medical History:   Diagnosis Date    Cancer     Breast    Ductal carcinoma in situ (DCIS) of breast 5/24/2022    Heart murmur     History of multiple miscarriages     Hypertension     Mitral valve prolapse     Mosaic Briceno syndrome     pt has seen Dr. Coleman.. last visit about 7 yrs ago.    Nasal fracture     PONV (postoperative nausea and vomiting)         Current Outpatient Medications:     ascorbic acid/vitamin E/biotin (HAIR, SKIN, NAILS WITH BIOTIN ORAL), Take 1 Dose by mouth nightly., Disp: , Rfl:     cephALEXin (KEFLEX) 500 MG capsule, Take 500 mg by mouth 4 (four) times daily., Disp: , Rfl:     cyanocobalamin/cobamamide (B12 SL), Place 1,000 mcg under the tongue nightly., Disp: , Rfl:     diazePAM (VALIUM) 5 MG tablet, Take 1 tablet by mouth every 8 hours as needed for muscle spasms, Disp: , Rfl:     multivit-minerals/folic acid (MULTIVITAMIN GUMMIES ORAL), Take 1 Dose by mouth nightly., Disp: , Rfl:     amlodipine-benazepril 5-20 mg (LOTREL) 5-20 mg per capsule, Take 1 capsule by mouth once daily. (Patient not taking: Reported on 12/22/2022), Disp: 90 capsule, Rfl: 0     Review of Systems:   Review of Systems             Objective:     Vitals:    12/22/22 1016   BP: (!) 147/86   Pulse: 95   Temp: 98.4 °F (36.9 °C)             Physical  Exam  Constitutional:       General: She is not in acute distress.     Appearance: Normal appearance. She is normal weight. She is not ill-appearing or toxic-appearing.   HENT:      Head: Normocephalic and atraumatic.      Mouth/Throat:      Pharynx: Oropharynx is clear.   Eyes:      Extraocular Movements: Extraocular movements intact.      Conjunctiva/sclera: Conjunctivae normal.      Pupils: Pupils are equal, round, and reactive to light.   Cardiovascular:      Rate and Rhythm: Normal rate and regular rhythm.      Pulses: Normal pulses.      Heart sounds: Normal heart sounds.   Pulmonary:      Effort: Pulmonary effort is normal.      Breath sounds: Normal breath sounds.   Chest:      Comments: Left-sided mastectomy drain  Abdominal:      General: Abdomen is flat. Bowel sounds are normal.      Palpations: Abdomen is soft.          Comments: Well-healing lower abdominal incision from tram flap reconstruction   Musculoskeletal:         General: Normal range of motion.      Cervical back: Neck supple.   Lymphadenopathy:      Upper Body:      Right upper body: No axillary adenopathy.      Left upper body: No axillary adenopathy.   Skin:     General: Skin is warm and dry.      Capillary Refill: Capillary refill takes less than 2 seconds.   Neurological:      General: No focal deficit present.      Mental Status: She is alert and oriented to person, place, and time. Mental status is at baseline.   Psychiatric:         Mood and Affect: Mood normal.         Behavior: Behavior normal.         Thought Content: Thought content normal.         Judgment: Judgment normal.           No visits with results within 1 Week(s) from this visit.   Latest known visit with results is:   Documentation Only on 12/12/2022   Component Date Value Ref Range Status    CRC Recommendation External 11/12/2018 No follow-up frequency specified   Final     Assessment and Plan     Problem List Items Addressed This Visit          Oncology    Ductal  carcinoma in situ (DCIS) of breast - Primary    Current Assessment & Plan     I discussed with this patient the  finding of DCIS in this ipsilateral breast despite lumpectomy and radiation.      She has not had enough tamoxifen to make a difference however there was still persistent disease.  And now she is had bilateral mastectomies.      Because there was no invasive disease  Because she is had no evidence of recurrent disease or disease in the lymph nodes, there is really no indication for adjuvant therapy in DCIS status post bilateral mastectomy.      Continue follow-up primary care on blood pressure  Avoid pregnancy x2 years  Avoidance of hormones  Continue follow-up in plastic surgery and primary and gyn              Follow up for discharge from clinic.        Matthew Bradford MD

## 2022-12-20 NOTE — ASSESSMENT & PLAN NOTE
· I discussed with this patient the  finding of DCIS in this ipsilateral breast despite lumpectomy and radiation.    ·   · She has not had enough tamoxifen to make a difference however there was still persistent disease.  And now she is had bilateral mastectomies.    ·   · Because there was no invasive disease  · Because she is had no evidence of recurrent disease or disease in the lymph nodes, there is really no indication for adjuvant therapy in DCIS status post bilateral mastectomy.    ·   · Continue follow-up primary care on blood pressure  · Avoid pregnancy x2 years  · Avoidance of hormones  · Continue follow-up in plastic surgery and primary and gyn  ·

## 2022-12-22 ENCOUNTER — OFFICE VISIT (OUTPATIENT)
Dept: HEMATOLOGY/ONCOLOGY | Facility: CLINIC | Age: 47
End: 2022-12-22
Payer: COMMERCIAL

## 2022-12-22 VITALS
SYSTOLIC BLOOD PRESSURE: 147 MMHG | TEMPERATURE: 98 F | OXYGEN SATURATION: 99 % | HEIGHT: 62 IN | BODY MASS INDEX: 28.44 KG/M2 | DIASTOLIC BLOOD PRESSURE: 86 MMHG | WEIGHT: 154.56 LBS | HEART RATE: 95 BPM

## 2022-12-22 DIAGNOSIS — D05.11 DUCTAL CARCINOMA IN SITU (DCIS) OF RIGHT BREAST: Primary | ICD-10-CM

## 2022-12-22 PROCEDURE — 99999 PR PBB SHADOW E&M-EST. PATIENT-LVL III: CPT | Mod: PBBFAC,,, | Performed by: INTERNAL MEDICINE

## 2022-12-22 PROCEDURE — 3008F BODY MASS INDEX DOCD: CPT | Mod: CPTII,S$GLB,, | Performed by: INTERNAL MEDICINE

## 2022-12-22 PROCEDURE — 3077F PR MOST RECENT SYSTOLIC BLOOD PRESSURE >= 140 MM HG: ICD-10-PCS | Mod: CPTII,S$GLB,, | Performed by: INTERNAL MEDICINE

## 2022-12-22 PROCEDURE — 1160F PR REVIEW ALL MEDS BY PRESCRIBER/CLIN PHARMACIST DOCUMENTED: ICD-10-PCS | Mod: CPTII,S$GLB,, | Performed by: INTERNAL MEDICINE

## 2022-12-22 PROCEDURE — 4010F ACE/ARB THERAPY RXD/TAKEN: CPT | Mod: CPTII,S$GLB,, | Performed by: INTERNAL MEDICINE

## 2022-12-22 PROCEDURE — 3008F PR BODY MASS INDEX (BMI) DOCUMENTED: ICD-10-PCS | Mod: CPTII,S$GLB,, | Performed by: INTERNAL MEDICINE

## 2022-12-22 PROCEDURE — 4010F PR ACE/ARB THEARPY RXD/TAKEN: ICD-10-PCS | Mod: CPTII,S$GLB,, | Performed by: INTERNAL MEDICINE

## 2022-12-22 PROCEDURE — 1159F MED LIST DOCD IN RCRD: CPT | Mod: CPTII,S$GLB,, | Performed by: INTERNAL MEDICINE

## 2022-12-22 PROCEDURE — 3079F PR MOST RECENT DIASTOLIC BLOOD PRESSURE 80-89 MM HG: ICD-10-PCS | Mod: CPTII,S$GLB,, | Performed by: INTERNAL MEDICINE

## 2022-12-22 PROCEDURE — 99214 PR OFFICE/OUTPT VISIT, EST, LEVL IV, 30-39 MIN: ICD-10-PCS | Mod: S$GLB,,, | Performed by: INTERNAL MEDICINE

## 2022-12-22 PROCEDURE — 99214 OFFICE O/P EST MOD 30 MIN: CPT | Mod: S$GLB,,, | Performed by: INTERNAL MEDICINE

## 2022-12-22 PROCEDURE — 3077F SYST BP >= 140 MM HG: CPT | Mod: CPTII,S$GLB,, | Performed by: INTERNAL MEDICINE

## 2022-12-22 PROCEDURE — 3079F DIAST BP 80-89 MM HG: CPT | Mod: CPTII,S$GLB,, | Performed by: INTERNAL MEDICINE

## 2022-12-22 PROCEDURE — 99999 PR PBB SHADOW E&M-EST. PATIENT-LVL III: ICD-10-PCS | Mod: PBBFAC,,, | Performed by: INTERNAL MEDICINE

## 2022-12-22 PROCEDURE — 1159F PR MEDICATION LIST DOCUMENTED IN MEDICAL RECORD: ICD-10-PCS | Mod: CPTII,S$GLB,, | Performed by: INTERNAL MEDICINE

## 2022-12-22 PROCEDURE — 1160F RVW MEDS BY RX/DR IN RCRD: CPT | Mod: CPTII,S$GLB,, | Performed by: INTERNAL MEDICINE

## 2022-12-22 RX ORDER — CEPHALEXIN 500 MG/1
500 CAPSULE ORAL 4 TIMES DAILY
COMMUNITY
Start: 2022-12-15 | End: 2023-01-09

## 2023-05-17 PROBLEM — Z00.00 WELLNESS EXAMINATION: Status: ACTIVE | Noted: 2023-05-17

## 2023-05-18 PROBLEM — E78.5 HYPERLIPIDEMIA: Status: ACTIVE | Noted: 2023-05-18

## 2023-05-18 PROBLEM — R73.9 HYPERGLYCEMIA: Status: ACTIVE | Noted: 2023-05-18

## 2023-06-20 ENCOUNTER — OFFICE VISIT (OUTPATIENT)
Dept: SURGICAL ONCOLOGY | Facility: CLINIC | Age: 48
End: 2023-06-20
Payer: COMMERCIAL

## 2023-06-20 VITALS
HEART RATE: 79 BPM | HEIGHT: 62 IN | WEIGHT: 156.81 LBS | BODY MASS INDEX: 28.86 KG/M2 | DIASTOLIC BLOOD PRESSURE: 76 MMHG | SYSTOLIC BLOOD PRESSURE: 120 MMHG

## 2023-06-20 DIAGNOSIS — D05.11 DUCTAL CARCINOMA IN SITU (DCIS) OF RIGHT BREAST: Primary | ICD-10-CM

## 2023-06-20 PROCEDURE — 3074F PR MOST RECENT SYSTOLIC BLOOD PRESSURE < 130 MM HG: ICD-10-PCS | Mod: CPTII,S$GLB,, | Performed by: SURGERY

## 2023-06-20 PROCEDURE — 99999 PR PBB SHADOW E&M-EST. PATIENT-LVL III: CPT | Mod: PBBFAC,,, | Performed by: SURGERY

## 2023-06-20 PROCEDURE — 99999 PR PBB SHADOW E&M-EST. PATIENT-LVL III: ICD-10-PCS | Mod: PBBFAC,,, | Performed by: SURGERY

## 2023-06-20 PROCEDURE — 3078F PR MOST RECENT DIASTOLIC BLOOD PRESSURE < 80 MM HG: ICD-10-PCS | Mod: CPTII,S$GLB,, | Performed by: SURGERY

## 2023-06-20 PROCEDURE — 1159F PR MEDICATION LIST DOCUMENTED IN MEDICAL RECORD: ICD-10-PCS | Mod: CPTII,S$GLB,, | Performed by: SURGERY

## 2023-06-20 PROCEDURE — 3074F SYST BP LT 130 MM HG: CPT | Mod: CPTII,S$GLB,, | Performed by: SURGERY

## 2023-06-20 PROCEDURE — 4010F PR ACE/ARB THEARPY RXD/TAKEN: ICD-10-PCS | Mod: CPTII,S$GLB,, | Performed by: SURGERY

## 2023-06-20 PROCEDURE — 1159F MED LIST DOCD IN RCRD: CPT | Mod: CPTII,S$GLB,, | Performed by: SURGERY

## 2023-06-20 PROCEDURE — 4010F ACE/ARB THERAPY RXD/TAKEN: CPT | Mod: CPTII,S$GLB,, | Performed by: SURGERY

## 2023-06-20 PROCEDURE — 99213 PR OFFICE/OUTPT VISIT, EST, LEVL III, 20-29 MIN: ICD-10-PCS | Mod: S$GLB,,, | Performed by: SURGERY

## 2023-06-20 PROCEDURE — 3008F BODY MASS INDEX DOCD: CPT | Mod: CPTII,S$GLB,, | Performed by: SURGERY

## 2023-06-20 PROCEDURE — 99213 OFFICE O/P EST LOW 20 MIN: CPT | Mod: S$GLB,,, | Performed by: SURGERY

## 2023-06-20 PROCEDURE — 3078F DIAST BP <80 MM HG: CPT | Mod: CPTII,S$GLB,, | Performed by: SURGERY

## 2023-06-20 PROCEDURE — 3008F PR BODY MASS INDEX (BMI) DOCUMENTED: ICD-10-PCS | Mod: CPTII,S$GLB,, | Performed by: SURGERY

## 2023-06-20 NOTE — PROGRESS NOTES
Chief complaint:  Recurrent DCIS right breast     HPI:  48-year-old female with a history of DCIS diagnosed in May 2022 treated with lumpectomy, negative margins of excision and adjuvant radiation therapy.  She had begun tamoxifen therapy.  Recent surveillance mammography demonstrated a large area of new suspicious calcifications in the upper-outer quadrant of the right breast spanning 8 cm, extending to the nipple.  MRI was subsequently performed as well and demonstrated asymmetric global enhancement throughout all 4 quadrants of the right breast including the nipple-areolar complex.  No evidence of suspicious findings in the left breast.  She had previously undergone genetic testing which was negative.    22:  Bilateral skin sparing mastectomies with free flap reconstruction, right-sided sentinel lymph node mapping and biopsy.  No residual DCIS on final path.  [place] node negative    23:  She presents today without complaint.  No suspicious findings on self-exam.  She is planning additional reconstructive surgery on the left side this fall.        Past Medical and Surgical History  Allergies :   Sulfa (sulfonamide antibiotics)    @[facility]@  Medical :   She has a past medical history of Cancer, Ductal carcinoma in situ (DCIS) of breast (2022), Heart murmur, History of multiple miscarriages, Hypertension, Mitral valve prolapse, Mosaic Briceno syndrome, Nasal fracture, and PONV (postoperative nausea and vomiting).    Surgical :   She has a past surgical history that includes Excision of rectal mass; Dilation and curettage of uterus; Colonoscopy (2018); Mole removal (Left);  (2015); Fracture surgery; Breast biopsy (Right, 2022); Insertion of localization wire (Right, 2022);  section ([id number]); Breast surgery (520); Rhinoplasty; biopsy of groin (Left); and Reconstruction of breast with deep inferior epigastric artery  (CALLIE) flap (2022).      Family History  Her family history includes Alzheimer's disease in her maternal grandmother and paternal grandfather; Breast cancer in her maternal grandmother and mother; Cancer in her father and mother; Dementia in her paternal grandmother; Diabetes in her paternal grandmother; Heart attack in her maternal grandfather; Heart disease in her maternal grandfather; Hypertension in her father and mother; Leukemia in her father; No Known Problems in her brother and sister.    Social History  She reports that she has never smoked. She has never used smokeless tobacco. She reports current alcohol use. She reports that she does not use drugs.     Review of Systems   Constitutional:  Negative for appetite change, chills, diaphoresis and fever.   HENT:  Negative for congestion, drooling, ear discharge, ear pain and hearing loss.    Eyes:  Negative for discharge.   Respiratory:  Negative for apnea, cough, choking, chest tightness, shortness of breath and stridor.    Cardiovascular:  Negative for chest pain, palpitations and leg swelling.   Endocrine: Negative for cold intolerance and heat intolerance.   Genitourinary:  Negative for difficulty urinating, dyspareunia, dysuria and hematuria.   Musculoskeletal:  Negative for arthralgias, gait problem and joint swelling.   Skin:  Negative for color change and rash.   Neurological:  Negative for dizziness, tremors, seizures, syncope, facial asymmetry, speech difficulty, light-headedness, numbness and headaches.   Psychiatric/Behavioral:  Negative for agitation and confusion.       Objective   Physical Exam  Vitals and nursing note reviewed.   Constitutional:       General: She is not in acute distress.     Appearance: Normal appearance. She is normal weight. She is not ill-appearing, toxic-appearing or diaphoretic.   HENT:      Head: Normocephalic and atraumatic.      Right Ear: External ear normal.      Left Ear: External ear normal.      Nose: Nose normal.      Mouth/Throat:       Mouth: Mucous membranes are moist.      Pharynx: Oropharynx is clear.   Eyes:      General: No scleral icterus.     Extraocular Movements: Extraocular movements intact.      Conjunctiva/sclera: Conjunctivae normal.      Pupils: Pupils are equal, round, and reactive to light.   Cardiovascular:      Rate and Rhythm: Normal rate and regular rhythm.      Pulses: Normal pulses.      Heart sounds: Normal heart sounds. No murmur heard.    No friction rub. No gallop.   Pulmonary:      Effort: Pulmonary effort is normal. No respiratory distress.      Breath sounds: Normal breath sounds. No stridor. No wheezing or rhonchi.   Chest:      Chest wall: No tenderness.   Breasts:     Right: No mass, nipple discharge, skin change or tenderness.      Left: No mass, nipple discharge, skin change or tenderness.      Comments: Bilateral reconstructed breasts without evidence of local regional recurrence  Abdominal:      General: Abdomen is flat. There is no distension.      Palpations: Abdomen is soft. There is no mass.      Tenderness: There is no abdominal tenderness. There is no right CVA tenderness, left CVA tenderness, guarding or rebound.      Hernia: No hernia is present.   Musculoskeletal:         General: No swelling, tenderness, deformity or signs of injury. Normal range of motion.      Cervical back: Normal range of motion and neck supple. No rigidity or tenderness.      Right lower leg: No edema.      Left lower leg: No edema.   Lymphadenopathy:      Cervical: No cervical adenopathy.      Upper Body:      Right upper body: No supraclavicular or axillary adenopathy.      Left upper body: No supraclavicular or axillary adenopathy.   Skin:     General: Skin is warm.      Capillary Refill: Capillary refill takes less than 2 seconds.      Coloration: Skin is not jaundiced or pale.      Findings: No bruising, erythema, lesion or rash.   Neurological:      General: No focal deficit present.      Mental Status: She is alert and  "oriented to person, place, and time. Mental status is at baseline.      Cranial Nerves: No cranial nerve deficit.      Sensory: No sensory deficit.      Motor: No weakness.      Coordination: Coordination normal.      Gait: Gait normal.   Psychiatric:         Mood and Affect: Mood normal.         Behavior: Behavior normal.         Thought Content: Thought content normal.         Judgment: Judgment normal.     VITAL SIGNS: 24 HR MIN & MAX LAST    @FLOWSTAT(6:24::1)@           @FLOWSTAT(5:24::1)@  120/76     @FLOWSTAT(8:24::1)@  79     @FLOWSTAT(9:24::1)@       @FLOWSTAT(10:24::1)@         HT: 5' 2" (157.5 cm)  WT: 71.1 kg (156 lb 12.8 oz)  BMI: 28.7       Assessment & Plan     Recurrent DCIS right breast, 8 cm area extending to the nipple, status post bilateral skin sparing mastectomies with reconstruction in December 2022.    No evidence of disease    Return to clinic in 1 year for physical exam        Scot Zarate MD  Surgical Oncology  Complex General, Gastrointestinal and Hepatobiliary Surgery            "

## 2023-08-21 PROBLEM — Z00.00 WELLNESS EXAMINATION: Status: RESOLVED | Noted: 2023-05-17 | Resolved: 2023-08-21

## 2024-05-17 PROBLEM — Z23 IMMUNIZATION DUE: Status: ACTIVE | Noted: 2024-05-17

## 2024-08-14 ENCOUNTER — PATIENT MESSAGE (OUTPATIENT)
Dept: SURGICAL ONCOLOGY | Facility: CLINIC | Age: 49
End: 2024-08-14
Payer: COMMERCIAL

## 2024-08-19 PROBLEM — Z00.00 ENCOUNTER FOR WELLNESS EXAMINATION IN ADULT: Status: RESOLVED | Noted: 2023-05-17 | Resolved: 2024-08-19

## 2024-10-22 ENCOUNTER — OFFICE VISIT (OUTPATIENT)
Dept: SURGICAL ONCOLOGY | Facility: CLINIC | Age: 49
End: 2024-10-22
Payer: COMMERCIAL

## 2024-10-22 VITALS
BODY MASS INDEX: 28.78 KG/M2 | WEIGHT: 156.38 LBS | TEMPERATURE: 99 F | DIASTOLIC BLOOD PRESSURE: 96 MMHG | OXYGEN SATURATION: 100 % | HEIGHT: 62 IN | HEART RATE: 89 BPM | SYSTOLIC BLOOD PRESSURE: 165 MMHG

## 2024-10-22 DIAGNOSIS — D05.11 DUCTAL CARCINOMA IN SITU (DCIS) OF RIGHT BREAST: Primary | ICD-10-CM

## 2024-10-22 PROCEDURE — 4010F ACE/ARB THERAPY RXD/TAKEN: CPT | Mod: CPTII,S$GLB,, | Performed by: SURGERY

## 2024-10-22 PROCEDURE — 3080F DIAST BP >= 90 MM HG: CPT | Mod: CPTII,S$GLB,, | Performed by: SURGERY

## 2024-10-22 PROCEDURE — 3008F BODY MASS INDEX DOCD: CPT | Mod: CPTII,S$GLB,, | Performed by: SURGERY

## 2024-10-22 PROCEDURE — 3044F HG A1C LEVEL LT 7.0%: CPT | Mod: CPTII,S$GLB,, | Performed by: SURGERY

## 2024-10-22 PROCEDURE — 99999 PR PBB SHADOW E&M-EST. PATIENT-LVL III: CPT | Mod: PBBFAC,,, | Performed by: SURGERY

## 2024-10-22 PROCEDURE — 1159F MED LIST DOCD IN RCRD: CPT | Mod: CPTII,S$GLB,, | Performed by: SURGERY

## 2024-10-22 PROCEDURE — 99213 OFFICE O/P EST LOW 20 MIN: CPT | Mod: S$GLB,,, | Performed by: SURGERY

## 2024-10-22 PROCEDURE — 3077F SYST BP >= 140 MM HG: CPT | Mod: CPTII,S$GLB,, | Performed by: SURGERY

## 2024-10-22 NOTE — PROGRESS NOTES
Chief complaint:  Recurrent DCIS right breast     HPI:  49-year-old female with a history of DCIS diagnosed in May 2022 treated with lumpectomy, negative margins of excision and adjuvant radiation therapy.  She had begun tamoxifen therapy.  Recent surveillance mammography demonstrated a large area of new suspicious calcifications in the upper-outer quadrant of the right breast spanning 8 cm, extending to the nipple.  MRI was subsequently performed as well and demonstrated asymmetric global enhancement throughout all 4 quadrants of the right breast including the nipple-areolar complex.  No evidence of suspicious findings in the left breast.  She had previously undergone genetic testing which was negative.    22:  Bilateral skin sparing mastectomies with free flap reconstruction, right-sided sentinel lymph node mapping and biopsy.  No residual DCIS on final path.  [place] node negative    10/22/24:  She presents today without complaint.  No suspicious findings on self-exam.  She is planning further revision reconstructive surgery     Past Medical and Surgical History  Allergies :   Sulfa (sulfonamide antibiotics)    @[facility]@  Medical :   She has a past medical history of Cancer, Ductal carcinoma in situ (DCIS) of breast (2022), Heart murmur, History of multiple miscarriages, Hypertension, Mitral valve prolapse, Mosaic Briceno syndrome, Nasal fracture, and PONV (postoperative nausea and vomiting).    Surgical :   She has a past surgical history that includes Excision of rectal mass; Dilation and curettage of uterus; Colonoscopy (2018); Mole removal (Left);  (2015); Fracture surgery; Breast biopsy (Right, 2022); Insertion of localization wire (Right, 2022); Breast surgery (520); Rhinoplasty; Reconstruction of breast with deep inferior epigastric artery  (CALLIE) flap (2022); and Flap graft surgery (Left, 10/2023).     Family History  Her family history  includes Alzheimer's disease in her maternal grandmother and paternal grandfather; Breast cancer in her maternal grandmother and mother; Cancer in her father and mother; Dementia in her paternal grandmother; Diabetes in her paternal grandmother; Heart attack in her maternal grandfather; Heart disease in her maternal grandfather; Hypertension in her father and mother; Leukemia in her father; No Known Problems in her sister; Seizures in her brother.    Social History  She reports that she has never smoked. She has never used smokeless tobacco. She reports current alcohol use of about 4.0 standard drinks of alcohol per week. She reports that she does not use drugs.     Review of Systems   Constitutional:  Negative for appetite change, chills, diaphoresis and fever.   HENT:  Negative for congestion, drooling, ear discharge, ear pain and hearing loss.    Eyes:  Negative for discharge.   Respiratory:  Negative for apnea, cough, choking, chest tightness, shortness of breath and stridor.    Cardiovascular:  Negative for chest pain, palpitations and leg swelling.   Endocrine: Negative for cold intolerance and heat intolerance.   Genitourinary:  Negative for difficulty urinating, dyspareunia, dysuria and hematuria.   Musculoskeletal:  Negative for arthralgias, gait problem and joint swelling.   Skin:  Negative for color change and rash.   Neurological:  Negative for dizziness, tremors, seizures, syncope, facial asymmetry, speech difficulty, light-headedness, numbness and headaches.   Psychiatric/Behavioral:  Negative for agitation and confusion.         Objective   Physical Exam  Vitals and nursing note reviewed.   Constitutional:       General: She is not in acute distress.     Appearance: Normal appearance. She is normal weight. She is not ill-appearing, toxic-appearing or diaphoretic.   HENT:      Head: Normocephalic and atraumatic.      Right Ear: External ear normal.      Left Ear: External ear normal.      Nose: Nose  normal.      Mouth/Throat:      Mouth: Mucous membranes are moist.      Pharynx: Oropharynx is clear.   Eyes:      General: No scleral icterus.     Extraocular Movements: Extraocular movements intact.      Conjunctiva/sclera: Conjunctivae normal.      Pupils: Pupils are equal, round, and reactive to light.   Cardiovascular:      Rate and Rhythm: Normal rate and regular rhythm.      Pulses: Normal pulses.      Heart sounds: Normal heart sounds. No murmur heard.     No friction rub. No gallop.   Pulmonary:      Effort: Pulmonary effort is normal. No respiratory distress.      Breath sounds: Normal breath sounds. No stridor. No wheezing or rhonchi.   Chest:      Chest wall: No tenderness.   Breasts:     Right: No mass, nipple discharge, skin change or tenderness.      Left: No mass, nipple discharge, skin change or tenderness.      Comments: Bilateral reconstructed breasts without evidence of local regional recurrence  Abdominal:      General: Abdomen is flat. There is no distension.      Palpations: Abdomen is soft. There is no mass.      Tenderness: There is no abdominal tenderness. There is no right CVA tenderness, left CVA tenderness, guarding or rebound.      Hernia: No hernia is present.   Musculoskeletal:         General: No swelling, tenderness, deformity or signs of injury. Normal range of motion.      Cervical back: Normal range of motion and neck supple. No rigidity or tenderness.      Right lower leg: No edema.      Left lower leg: No edema.   Lymphadenopathy:      Cervical: No cervical adenopathy.      Upper Body:      Right upper body: No supraclavicular or axillary adenopathy.      Left upper body: No supraclavicular or axillary adenopathy.   Skin:     General: Skin is warm.      Capillary Refill: Capillary refill takes less than 2 seconds.      Coloration: Skin is not jaundiced or pale.      Findings: No bruising, erythema, lesion or rash.   Neurological:      General: No focal deficit present.       "Mental Status: She is alert and oriented to person, place, and time. Mental status is at baseline.      Cranial Nerves: No cranial nerve deficit.      Sensory: No sensory deficit.      Motor: No weakness.      Coordination: Coordination normal.      Gait: Gait normal.   Psychiatric:         Mood and Affect: Mood normal.         Behavior: Behavior normal.         Thought Content: Thought content normal.         Judgment: Judgment normal.       VITAL SIGNS: 24 HR MIN & MAX LAST    @FLOWSTAT(6:24::1)@  98.7 °F (37.1 °C)        @FLOWSTAT(5:24::1)@  (!) 165/96 (hasnt taking bp med yet)     @FLOWSTAT(8:24::1)@  89     @FLOWSTAT(9:24::1)@       @FLOWSTAT(10:24::1)@  100 %      HT: 5' 2" (157.5 cm)  WT: 70.9 kg (156 lb 6.4 oz)  BMI: 28.6       Assessment & Plan     Recurrent DCIS right breast, 8 cm area extending to the nipple, status post bilateral skin sparing mastectomies with reconstruction in December 2022.    No evidence of disease    Continue routine follow-up with Medical Oncology and Plastic surgery        Scot Zarate MD  Surgical Oncology  Complex General, Gastrointestinal and Hepatobiliary Surgery              "

## (undated) DEVICE — CHARM MARGINMAP SPEC 5MM

## (undated) DEVICE — SOL 9P NACL IRR PIC IL

## (undated) DEVICE — SPONGE LAP STRL 18X18IN

## (undated) DEVICE — NDL HYPO REG 25G X 1 1/2

## (undated) DEVICE — SPONGE GAUZE 16PLY 4X4

## (undated) DEVICE — ELECTRODE BLADE E-Z CLEAN 4IN

## (undated) DEVICE — SUT MCRYL PLUS 4-0 PS2 27IN

## (undated) DEVICE — COVER PROBE US 5.5X58L NON LTX

## (undated) DEVICE — GAUZE SPONGE 4X4 12PLY

## (undated) DEVICE — GLOVE PROTEXIS LTX MICRO 6.5

## (undated) DEVICE — DRESSING TRANS 4X4 TEGADERM

## (undated) DEVICE — SUT 2/0 30IN SILK BLK BRAI

## (undated) DEVICE — SUT VICRYL PLUS 3-0 SH 18IN

## (undated) DEVICE — DRESSING TELFA N ADH 3X8IN

## (undated) DEVICE — CLIP LIGATING MEDIUM

## (undated) DEVICE — SYR 10CC LUER LOCK

## (undated) DEVICE — APPLIER LIGACLIP MED 11IN

## (undated) DEVICE — WIRE DUALOK BRST LSN 20GX10.7
Type: IMPLANTABLE DEVICE | Site: BREAST | Status: NON-FUNCTIONAL
Removed: 2022-05-11

## (undated) DEVICE — GLOVE PROTEXIS BLUE LATEX 7

## (undated) DEVICE — GLOVE PROTEXIS LTX MICRO  7

## (undated) DEVICE — Device

## (undated) DEVICE — ADHESIVE DERMABOND ADVANCED

## (undated) DEVICE — SOL NACL IRR 1000ML BTL

## (undated) DEVICE — GLOVE PROTEXIS HYDROGEL SZ6.5

## (undated) DEVICE — DRESSING TRANS 8X12 TEGADERM